# Patient Record
Sex: MALE | Race: WHITE | Employment: FULL TIME | URBAN - METROPOLITAN AREA
[De-identification: names, ages, dates, MRNs, and addresses within clinical notes are randomized per-mention and may not be internally consistent; named-entity substitution may affect disease eponyms.]

---

## 2017-03-09 DIAGNOSIS — R73.03 PREDIABETES: ICD-10-CM

## 2017-03-09 RX ORDER — METFORMIN HYDROCHLORIDE 500 MG/1
500 TABLET, EXTENDED RELEASE ORAL
Qty: 90 TAB | Refills: 0 | Status: SHIPPED | OUTPATIENT
Start: 2017-03-09 | End: 2017-07-05 | Stop reason: SDUPTHER

## 2017-03-27 ENCOUNTER — TELEPHONE (OUTPATIENT)
Dept: FAMILY MEDICINE CLINIC | Age: 47
End: 2017-03-27

## 2017-03-27 DIAGNOSIS — I10 ESSENTIAL HYPERTENSION: Primary | ICD-10-CM

## 2017-03-27 DIAGNOSIS — R73.03 PREDIABETES: ICD-10-CM

## 2017-03-27 NOTE — TELEPHONE ENCOUNTER
Patient would like to know if he needs to have fasting labs for his next appt as he would like to have them drawn prior to his visit. If so, he would like to have the lab slips placed up front for p/u.

## 2017-03-29 NOTE — TELEPHONE ENCOUNTER
Called and spoke with pt's wife, and she has been advised pt's lab slips are at the  for . Pt's wife request lab slips be mailed to the home, this has been done.

## 2017-04-19 RX ORDER — MONTELUKAST SODIUM 10 MG/1
TABLET ORAL
Qty: 90 TAB | Refills: 3 | Status: SHIPPED | OUTPATIENT
Start: 2017-04-19 | End: 2017-06-16 | Stop reason: SDUPTHER

## 2017-06-07 LAB
BUN SERPL-MCNC: 16 MG/DL (ref 6–24)
BUN/CREAT SERPL: 19 (ref 9–20)
CALCIUM SERPL-MCNC: 9.8 MG/DL (ref 8.7–10.2)
CHLORIDE SERPL-SCNC: 100 MMOL/L (ref 96–106)
CO2 SERPL-SCNC: 25 MMOL/L (ref 18–29)
CREAT SERPL-MCNC: 0.86 MG/DL (ref 0.76–1.27)
EST. AVERAGE GLUCOSE BLD GHB EST-MCNC: 137 MG/DL
GLUCOSE SERPL-MCNC: 120 MG/DL (ref 65–99)
HBA1C MFR BLD: 6.4 % (ref 4.8–5.6)
POTASSIUM SERPL-SCNC: 4.3 MMOL/L (ref 3.5–5.2)
SODIUM SERPL-SCNC: 141 MMOL/L (ref 134–144)

## 2017-06-09 ENCOUNTER — OFFICE VISIT (OUTPATIENT)
Dept: FAMILY MEDICINE CLINIC | Age: 47
End: 2017-06-09

## 2017-06-09 VITALS
WEIGHT: 256 LBS | RESPIRATION RATE: 20 BRPM | BODY MASS INDEX: 36.65 KG/M2 | SYSTOLIC BLOOD PRESSURE: 123 MMHG | TEMPERATURE: 97.9 F | HEIGHT: 70 IN | DIASTOLIC BLOOD PRESSURE: 76 MMHG | HEART RATE: 80 BPM

## 2017-06-09 DIAGNOSIS — E66.9 NON MORBID OBESITY, UNSPECIFIED OBESITY TYPE: ICD-10-CM

## 2017-06-09 DIAGNOSIS — R73.03 PREDIABETES: ICD-10-CM

## 2017-06-09 DIAGNOSIS — I10 ESSENTIAL HYPERTENSION: Primary | ICD-10-CM

## 2017-06-09 NOTE — PROGRESS NOTES
Chief Complaint   Patient presents with    Hypertension     follow up     Diabetes     prediabetes

## 2017-06-09 NOTE — PROGRESS NOTES
HISTORY OF PRESENT ILLNESS  Mario Shaikh is a 50 y.o. male. Hypertension   The history is provided by the patient. This is a chronic problem. The current episode started more than 1 week ago. The problem occurs constantly. The problem has not changed since onset. Pertinent negatives include no chest pain, no abdominal pain, no headaches and no shortness of breath. Nothing aggravates the symptoms. Relieved by: lifestyle modifications. Treatments tried: lifestyle modifications. The treatment provided significant relief. Other   The history is provided by the patient (prediabetes). Pertinent negatives include no chest pain, no abdominal pain, no headaches and no shortness of breath. Treatments tried: metformin. Review of Systems   Constitutional: Negative for weight loss. Weight gain   Eyes: Negative for blurred vision. Respiratory: Negative for shortness of breath. Cardiovascular: Negative for chest pain and leg swelling. Gastrointestinal: Negative for abdominal pain. Genitourinary: Negative for frequency. Neurological: Negative for dizziness, sensory change, speech change, focal weakness and headaches. Endo/Heme/Allergies: Negative for polydipsia. Lab Results   Component Value Date/Time    Hemoglobin A1c 6.4 06/06/2017 08:42 AM         Visit Vitals    /76    Pulse 80    Temp 97.9 °F (36.6 °C) (Oral)    Resp 20    Ht 5' 10\" (1.778 m)    Wt 256 lb (116.1 kg)    BMI 36.73 kg/m2     BP Readings from Last 3 Encounters:   06/09/17 123/76   09/29/16 120/83   03/18/16 118/76     Physical Exam   Constitutional: He is oriented to person, place, and time. He appears well-developed and well-nourished. No distress. Cardiovascular: Normal rate, regular rhythm and normal heart sounds. Exam reveals no gallop and no friction rub. No murmur heard. Pulmonary/Chest: Effort normal and breath sounds normal. No respiratory distress. He has no wheezes. He has no rales. Musculoskeletal: He exhibits no edema. Neurological: He is alert and oriented to person, place, and time. Skin: Skin is warm and dry. He is not diaphoretic. Nursing note and vitals reviewed. ASSESSMENT and PLAN    ICD-10-CM ICD-9-CM    1. Essential hypertension I10 401.9    2. Prediabetes R73.03 790.29    3. Non morbid obesity, unspecified obesity type E66.9 278.00         Blood pressure controlled  Prediabetic, almost diabetic by A1C  The need for weight loss and exercise emphasized for prevention of diabetes  Continue current plans. I have reviewed/discussed the above normal BMI with the patient. I have recommended the following interventions: dietary management education, guidance, and counseling . Yvonne Morales Follow-up Disposition:  Return in about 6 months (around 12/9/2017) for blood pressure, prediabetes. Reviewed plan of care. Patient has provided input and agrees with goals.

## 2017-06-09 NOTE — MR AVS SNAPSHOT
Visit Information Date & Time Provider Department Dept. Phone Encounter #  
 6/9/2017  2:00 PM Clint [facility]burn[facility] [id number] Follow-up Instructions Return in about 6 months (around 12/9/2017) for blood pressure, prediabetes. Upcoming Health Maintenance Date Due INFLUENZA AGE 9 TO ADULT 8/1/2017 DTaP/Tdap/Td series (2 - Td) 3/8/2026 Allergies as of 6/9/2017  Review Complete On: 6/9/2017 By: Clint Washburn MD  
 No Known Allergies Current Immunizations  Never Reviewed Name Date DTaP 3/8/2016 Influenza Vaccine (Quad) PF 11/14/2016, 11/6/2015, 9/23/2014 Not reviewed this visit You Were Diagnosed With   
  
 Codes Comments Essential hypertension    -  Primary ICD-10-CM: I10 
ICD-9-CM: 401.9 Prediabetes     ICD-10-CM: R73.03 
ICD-9-CM: 790.29 Non morbid obesity, unspecified obesity type     ICD-10-CM: E66.9 ICD-9-CM: 278.00 Vitals BP Pulse Temp Resp Height(growth percentile) Weight(growth percentile) 123/76 80 97.9 °F (36.6 °C) (Oral) 20 5' 10\" (1.778 m) 256 lb (116.1 kg) BMI Smoking Status 36.73 kg/m2 Never Smoker Vitals History BMI and BSA Data Body Mass Index Body Surface Area  
 36.73 kg/m 2 2.39 m 2 Preferred Pharmacy Pharmacy Name Phone 305 [facility], [address] [place] [address] [address] [facility] Your Updated Medication List  
  
   
This list is accurate as of: 6/9/17  2:57 PM.  Always use your most recent med list.  
  
  
  
  
 ALEVE 220 mg tablet Generic drug:  naproxen sodium Take 1 Tab by mouth two (2) times daily as needed. CO Q-10 10 mg Cap Generic drug:  coenzyme q10 Take  by mouth.  
  
 esomeprazole 20 mg capsule Commonly known as:  Ansari Sondra Take  by mouth daily. fenofibrate 160 mg tablet Commonly known as:  LOFIBRA Take 1 tablet by mouth  daily before breakfast  
  
 glucosamine-chondroitin 500-400 mg Cap Commonly known as:  20 [facility] Take 1 Cap by mouth daily. metFORMIN  mg tablet Commonly known as:  GLUCOPHAGE XR Take 1 Tab by mouth daily (with dinner). montelukast 10 mg tablet Commonly known as:  SINGULAIR Take 1 tablet by mouth  daily  
  
 multivitamin tablet Commonly known as:  ONE A DAY Take 1 Tab by mouth daily. sertraline 100 mg tablet Commonly known as:  ZOLOFT Take 1 tablet by mouth  daily  
  
 simvastatin 40 mg tablet Commonly known as:  ZOCOR Take 1 tablet by mouth  nightly Follow-up Instructions Return in about 6 months (around 12/9/2017) for blood pressure, prediabetes. Introducing [facility] & HE[facility]! Mervat Pritchett introduces Siriona patient portal. Now you can access parts of your medical record, email your doctor's office, and request medication refills online. 1. In your internet browser, go to [email or web address] 2. Click on the First Time User? Click Here link in the Sign In box. You will see the New Member Sign Up page. 3. Enter your Siriona Access Code exactly as it appears below. You will not need to use this code after youve completed the sign-up process. If you do not sign up before the expiration date, you must request a new code. · Siriona Access Code: [email or web address] Expires: 9/7/2017  1:51 PM 
 
4. Enter the last four digits of your Social Security Number (xxxx) and Date of Birth (mm/dd/yyyy) as indicated and click Submit. You will be taken to the next sign-up page. 5. Create a Siriona ID. This will be your Siriona login ID and cannot be changed, so think of one that is secure and easy to remember. 6. Create a Siriona password. You can change your password at any time. 7. Enter your Password Reset Question and Answer. This can be used at a later time if you forget your password. 8. Enter your e-mail address. You will receive e-mail notification when new information is available in [address] 9. Click Sign Up. You can now view and download portions of your medical record. 10. Click the Download Summary menu link to download a portable copy of your medical information. If you have questions, please visit the Frequently Asked Questions section of the Re[employer]on website. Remember, Replicon is NOT to be used for urgent needs. For medical emergencies, dial 911. Now available from your iPhone and Android! Please provide this summary of care documentation to your next provider. Your primary care clinician is listed as Dulce Gomez. If you have any questions after today's visit, please call [phone].

## 2017-06-17 RX ORDER — MONTELUKAST SODIUM 10 MG/1
TABLET ORAL
Qty: 90 TAB | Refills: 3 | Status: SHIPPED | COMMUNITY
Start: 2017-06-17 | End: 2017-07-05 | Stop reason: SDUPTHER

## 2017-07-05 DIAGNOSIS — R73.03 PREDIABETES: ICD-10-CM

## 2017-07-06 RX ORDER — METFORMIN HYDROCHLORIDE 500 MG/1
500 TABLET, EXTENDED RELEASE ORAL
Qty: 90 TAB | Refills: 1 | Status: SHIPPED | OUTPATIENT
Start: 2017-07-06 | End: 2017-11-10 | Stop reason: SDUPTHER

## 2017-07-06 RX ORDER — MONTELUKAST SODIUM 10 MG/1
TABLET ORAL
Qty: 90 TAB | Refills: 3 | Status: SHIPPED | OUTPATIENT
Start: 2017-07-06 | End: 2018-08-13 | Stop reason: SDUPTHER

## 2017-09-09 DIAGNOSIS — E78.00 HYPERCHOLESTEROLEMIA: Primary | ICD-10-CM

## 2017-09-10 RX ORDER — FENOFIBRATE 160 MG/1
TABLET ORAL
Qty: 90 TAB | Refills: 0 | Status: SHIPPED | OUTPATIENT
Start: 2017-09-10 | End: 2017-11-29 | Stop reason: SDUPTHER

## 2017-09-11 NOTE — TELEPHONE ENCOUNTER
Please call patient and let him know he is due to have his cholesterol checked. I have printed a lab slip.

## 2017-09-11 NOTE — TELEPHONE ENCOUNTER
Called and spoke with pt, and he has been advised and states understanding of this. Lab slips have been mailed to pt.

## 2017-09-13 RX ORDER — SIMVASTATIN 40 MG/1
TABLET, FILM COATED ORAL
Qty: 90 TAB | Refills: 0 | Status: SHIPPED | OUTPATIENT
Start: 2017-09-13 | End: 2017-12-02 | Stop reason: SDUPTHER

## 2017-11-10 DIAGNOSIS — R73.03 PREDIABETES: ICD-10-CM

## 2017-11-10 NOTE — TELEPHONE ENCOUNTER
Pt called requesting refill for his Metformin be sent to his mail order pharmacy. Pt almost out of prescription and was advised to contact pharmacy for express shipping if available to avoid running out of his medication.  Anthony

## 2017-11-13 RX ORDER — METFORMIN HYDROCHLORIDE 500 MG/1
500 TABLET, EXTENDED RELEASE ORAL
Qty: 90 TAB | Refills: 1 | Status: SHIPPED | OUTPATIENT
Start: 2017-11-13 | End: 2018-08-01 | Stop reason: SDUPTHER

## 2017-11-29 DIAGNOSIS — E78.00 HYPERCHOLESTEROLEMIA: ICD-10-CM

## 2017-12-03 RX ORDER — FENOFIBRATE 160 MG/1
TABLET ORAL
Qty: 90 TAB | Refills: 0 | Status: SHIPPED | OUTPATIENT
Start: 2017-12-03 | End: 2018-02-21 | Stop reason: SDUPTHER

## 2017-12-03 NOTE — TELEPHONE ENCOUNTER
Please call patient and remind them to have the labs I ordered in September done. Let them know I cannot continue to refill their prescription until they have these done.

## 2017-12-04 RX ORDER — SIMVASTATIN 40 MG/1
TABLET, FILM COATED ORAL
Qty: 90 TAB | Refills: 0 | Status: SHIPPED | OUTPATIENT
Start: 2017-12-04 | End: 2018-02-22 | Stop reason: SDUPTHER

## 2017-12-05 NOTE — TELEPHONE ENCOUNTER
Please call patient and remind them to have the cholesterol labs I ordered in September done. I cannot continue to refill their medications until these have been done.

## 2018-01-31 LAB
ALBUMIN SERPL-MCNC: 4.7 G/DL (ref 3.5–5.5)
ALP SERPL-CCNC: 50 IU/L (ref 39–117)
ALT SERPL-CCNC: 27 IU/L (ref 0–44)
AST SERPL-CCNC: 21 IU/L (ref 0–40)
BILIRUB DIRECT SERPL-MCNC: 0.1 MG/DL (ref 0–0.4)
BILIRUB SERPL-MCNC: 0.3 MG/DL (ref 0–1.2)
CHOLEST SERPL-MCNC: 154 MG/DL (ref 100–199)
HDL SERPL-SCNC: 41.7 UMOL/L
HDLC SERPL-MCNC: 43 MG/DL
INTERPRETATION, 910389: NORMAL
LDL SERPL QN: 20.4 NM
LDL SERPL-SCNC: 944 NMOL/L
LDL SMALL SERPL-SCNC: 504 NMOL/L
LDLC SERPL CALC-MCNC: 82 MG/DL (ref 0–99)
LP-IR SCORE SERPL: 76
PROT SERPL-MCNC: 7.2 G/DL (ref 6–8.5)
TRIGL SERPL-MCNC: 146 MG/DL (ref 0–149)

## 2018-02-02 ENCOUNTER — OFFICE VISIT (OUTPATIENT)
Dept: FAMILY MEDICINE CLINIC | Age: 48
End: 2018-02-02

## 2018-02-02 VITALS
BODY MASS INDEX: 36.08 KG/M2 | HEIGHT: 70 IN | HEART RATE: 74 BPM | DIASTOLIC BLOOD PRESSURE: 85 MMHG | WEIGHT: 252 LBS | TEMPERATURE: 98.3 F | SYSTOLIC BLOOD PRESSURE: 137 MMHG | RESPIRATION RATE: 18 BRPM

## 2018-02-02 DIAGNOSIS — R73.03 PREDIABETES: ICD-10-CM

## 2018-02-02 DIAGNOSIS — Z23 ENCOUNTER FOR IMMUNIZATION: ICD-10-CM

## 2018-02-02 DIAGNOSIS — L85.3 XEROSIS OF SKIN: ICD-10-CM

## 2018-02-02 DIAGNOSIS — I10 ESSENTIAL HYPERTENSION: Primary | ICD-10-CM

## 2018-02-02 DIAGNOSIS — L82.1 SK (SEBORRHEIC KERATOSIS): ICD-10-CM

## 2018-02-02 RX ORDER — HYDROCORTISONE 25 MG/G
CREAM TOPICAL 2 TIMES DAILY
Qty: 30 G | Refills: 0 | Status: SHIPPED | OUTPATIENT
Start: 2018-02-02 | End: 2018-11-15

## 2018-02-02 NOTE — PROGRESS NOTES
Chief Complaint   Patient presents with    Blood Pressure Check     and prediabetes f/u     1. Have you been to the ER, urgent care clinic since your last visit? Yes [facility]2017 dx sinus infection  Hospitalized since your last visit? No    2. Have you seen or consulted any other health care providers outside of the [address] [place] [address] since your last visit? Include any pap smears or colon screening.  No

## 2018-02-02 NOTE — PATIENT INSTRUCTIONS
Seborrheic Keratosis: Care Instructions  Your Care Instructions  Seborrheic keratoses are raised skin growths that look scaly or warty. They usually look like they were stuck onto the skin. They most often grow in groups on the back or chest and are more common in older people. A seborrheic keratosis can be tan or dark brown. A seborrheic keratosis is not a mole and is almost always harmless. But it is still a good idea to check your skin regularly. Sometimes a seborrheic keratosis can itch. Scratching it can cause it to bleed and sometimes even scar. A seborrheic keratosis is removed only if it bothers you. The doctor will freeze it or scrape it off with a tool. The doctor can also use a laser to remove a seborrheic keratosis. Treatment usually results in normal-looking skin, but it can leave a light or dark artemio or even a scar on the skin. Follow-up care is a key part of your treatment and safety. Be sure to make and go to all appointments, and call your doctor if you are having problems. It's also a good idea to know your test results and keep a list of the medicines you take. How can you care for yourself at home? · If clothing irritates your seborrheic keratosis, cover it with a bandage to prevent rubbing and bleeding. · If you have a seborrheic keratosis removed, clean the area with soap and water two times a day unless your doctor gives you different instructions. Don't use hydrogen peroxide or alcohol, which can slow healing. ¨ You may cover the wound with a thin layer of petroleum jelly, such as Vaseline, and a nonstick bandage. · Check all the skin on your body once a month for skin growths or other changes, such as color and feel of the skin. ¨  front of a full-length mirror. Look carefully at the front and back of your body. Then look at your right and left sides with your arms raised.   SIR[facility] your elbows and look carefully at your forearms, the back of your upper arms, and your palms.  ¨ Look at your feet, the soles of your feet, and the spaces between your toes. ¨ Use a hand mirror to look at the back of your legs, the back of your neck, and your back, rear end (buttocks), and genital area. Part the hair on your head to look at your scalp. · If you see a change in a skin growth, contact your doctor. Look for:  ¨ A mole that bleeds. ¨ A fast-growing mole. ¨ A scaly or crusted growth on the skin. ¨ A sore that will not heal.  When should you call for help? Call your doctor now or seek immediate medical care if:  ? · You have an area of normal skin that suddenly changes in shape, size, or how it looks. ? · Your skin is badly broken from scratching. ? · You have signs of infection such as:  ¨ Pain, warmth, or swelling in your skin. ¨ Red streaks near a wound in your skin. ¨ Pus coming from a wound in your skin. ¨ A fever not due to the flu or other illness. ? Watch closely for changes in your health, and be sure to contact your doctor if:  ? · You do not get better as expected. Where can you learn more? Go to http://alba-sarmad.info/. Enter T193 in the search box to learn more about \"Seborrheic Keratosis: Care Instructions. \"  Current as of: October 13, 2016  Content Version: 11.4  © 6146-3944 [employer]. Care instructions adapted under license by [employer] (which disclaims liability or warranty for this information). If you have questions about a medical condition or this instruction, always ask your healthcare professional. [address] any warranty or liability for your use of this information.

## 2018-02-02 NOTE — MR AVS SNAPSHOT
[address] 
[phone] Patient: Love Abbott MRN: [id number] :1970 Visit Information Date & Time Provider Department Dept. Phone Encounter #  
 2018  2:45 PM Dev [facility] 34 [id number] Follow-up Instructions Return in about 6 months (around 2018) for blood pressure, prediabetes. Upcoming Health Maintenance Date Due DTaP/Tdap/Td series (2 - Tdap) 3/8/2026 Allergies as of 2018  Review Complete On: 2018 By: James Jo MD  
 No Known Allergies Current Immunizations  Reviewed on 2018 Name Date DTaP 3/8/2016 Influenza Vaccine (Quad) PF 2018, 2016, 2015, 2014 Reviewed by Mckay Perez LPN on 2757 at  1:21 PM  
You Were Diagnosed With   
  
 Codes Comments Encounter for immunization    -  Primary ICD-10-CM: G74 ICD-9-CM: V03.89 Prediabetes     ICD-10-CM: R73.03 
ICD-9-CM: 790.29 Essential hypertension     ICD-10-CM: I10 
ICD-9-CM: 401.9 SK (seborrheic keratosis)     ICD-10-CM: L82.1 ICD-9-CM: 702.19 Xerosis of skin     ICD-10-CM: L85.3 ICD-9-CM: 706.8 Vitals BP Pulse Temp Resp Height(growth percentile) Weight(growth percentile) 137/85 74 98.3 °F (36.8 °C) (Oral) 18 5' 10\" (1.778 m) 252 lb (114.3 kg) BMI Smoking Status 36.16 kg/m2 Never Smoker Vitals History BMI and BSA Data Body Mass Index Body Surface Area  
 36.16 kg/m 2 2.38 m 2 Preferred Pharmacy Pharmacy Name Phone 305 [facility], [address] [address] [place] [address] [address] Your Updated Medication List  
  
   
This list is accurate as of: 18  4:12 PM.  Always use your most recent med list.  
  
  
  
  
 ALEVE 220 mg tablet Generic drug:  naproxen sodium Take 1 Tab by mouth two (2) times daily as needed. CO Q-10 10 mg Cap Generic drug:  coenzyme q10 Take  by mouth.  
  
 esomeprazole 20 mg capsule Commonly known as:  Jose Antonio Jetty Take  by mouth daily. fenofibrate 160 mg tablet Commonly known as:  LOFIBRA TAKE 1 TABLET BY MOUTH  DAILY BEFORE BREAKFAST  
  
 glucosamine-chondroitin 500-400 mg Cap Commonly known as:  20 [facility] Take 1 Cap by mouth daily. hydrocortisone 2.5 % topical cream  
Commonly known as:  HYTONE Apply  to affected area two (2) times a day. metFORMIN  mg tablet Commonly known as:  GLUCOPHAGE XR Take 1 Tab by mouth daily (with dinner). montelukast 10 mg tablet Commonly known as:  SINGULAIR Take 1 tablet by mouth  daily  
  
 multivitamin tablet Commonly known as:  ONE A DAY Take 1 Tab by mouth daily. sertraline 100 mg tablet Commonly known as:  ZOLOFT Take 1 tablet by mouth  daily  
  
 simvastatin 40 mg tablet Commonly known as:  ZOCOR  
TAKE 1 TABLET BY MOUTH  NIGHTLY Prescriptions Sent to Pharmacy Refills  
 hydrocortisone (HYTONE) 2.5 % topical cream 0 Sig: Apply  to affected area two (2) times a day. Class: Normal  
 Pharmacy: [facility] Ph #: [phone] Route: Topical  
  
We Performed the Following HEMOGLOBIN A1C WITH EAG [88861 CPT(R)] INFLUENZA VIRUS VAC QUAD,SPLIT,PRESV FREE SYRINGE IM [id number] CPT(R)] METABOLIC PANEL, BASIC [20843 CPT(R)] Follow-up Instructions Return in about 6 months (around 8/2/2018) for blood pressure, prediabetes. Patient Instructions Seborrheic Keratosis: Care Instructions Your Care Instructions Seborrheic keratoses are raised skin growths that look scaly or warty. They usually look like they were stuck onto the skin. They most often grow in groups on the back or chest and are more common in older people. A seborrheic keratosis can be tan or dark brown.  A seborrheic keratosis is not a mole and is almost always harmless. But it is still a good idea to check your skin regularly. Sometimes a seborrheic keratosis can itch. Scratching it can cause it to bleed and sometimes even scar. A seborrheic keratosis is removed only if it bothers you. The doctor will freeze it or scrape it off with a tool. The doctor can also use a laser to remove a seborrheic keratosis. Treatment usually results in normal-looking skin, but it can leave a light or dark artemio or even a scar on the skin. Follow-up care is a key part of your treatment and safety. Be sure to make and go to all appointments, and call your doctor if you are having problems. It's also a good idea to know your test results and keep a list of the medicines you take. How can you care for yourself at home? · If clothing irritates your seborrheic keratosis, cover it with a bandage to prevent rubbing and bleeding. · If you have a seborrheic keratosis removed, clean the area with soap and water two times a day unless your doctor gives you different instructions. Don't use hydrogen peroxide or alcohol, which can slow healing. ¨ You may cover the wound with a thin layer of petroleum jelly, such as Vaseline, and a nonstick bandage. · Check all the skin on your body once a month for skin growths or other changes, such as color and feel of the skin. ¨  front of a full-length mirror. Look carefully at the front and back of your body. Then look at your right and left sides with your arms raised. [facility]IR[facility] your elbows and look carefully at your forearms, the back of your upper arms, and your palms. ¨ Look at your feet, the soles of your feet, and the spaces between your toes. ¨ Use a hand mirror to look at the back of your legs, the back of your neck, and your back, rear end (buttocks), and genital area. Part the hair on your head to look at your scalp. · If you see a change in a skin growth, contact your doctor. Look for: ¨ A mole that bleeds. ¨ A fast-growing mole. ¨ A scaly or crusted growth on the skin. ¨ A sore that will not heal. 
When should you call for help? Call your doctor now or seek immediate medical care if: 
? · You have an area of normal skin that suddenly changes in shape, size, or how it looks. ? · Your skin is badly broken from scratching. ? · You have signs of infection such as: 
¨ Pain, warmth, or swelling in your skin. ¨ Red streaks near a wound in your skin. ¨ Pus coming from a wound in your skin. ¨ A fever not due to the flu or other illness. ? Watch closely for changes in your health, and be sure to contact your doctor if: 
? · You do not get better as expected. Where can you learn more? Go to http://alba-sarmad.info/. Enter O990 in the search box to learn more about \"Seborrheic Keratosis: Care Instructions. \" Current as of: October 13, 2016 Content Version: 11.4 © 0442-9648 [employer]. Care instructions adapted under license by [employer] (which disclaims liability or warranty for this information). If you have questions about a medical condition or this instruction, always ask your healthcare professional. [address] any warranty or liability for your use of this information. Introducing [facility] & HEALTH SERVICES! Pool Rivas introduces 1Ring patient portal. Now you can access parts of your medical record, email your doctor's office, and request medication refills online. 1. In your internet browser, go to [email or web address] 2. Click on the First Time User? Click Here link in the Sign In box. You will see the New Member Sign Up page. 3. Enter your 1Ring Access Code exactly as it appears below. You will not need to use this code after youve completed the sign-up process. If you do not sign up before the expiration date, you must request a new code. · 1Ring Access Code: [email or web address] Expires: 5/3/2018  4:11 PM 
 
4. Enter the last four digits of your Social Security Number (xxxx) and Date of Birth (mm/dd/yyyy) as indicated and click Submit. You will be taken to the next sign-up page. 5. Create a GetGoing ID. This will be your GetGoing login ID and cannot be changed, so think of one that is secure and easy to remember. 6. Create a GetGoing password. You can change your password at any time. 7. Enter your Password Reset Question and Answer. This can be used at a later time if you forget your password. 8. Enter your e-mail address. You will receive e-mail notification when new information is available in [address] 9. Click Sign Up. You can now view and download portions of your medical record. 10. Click the Download Summary menu link to download a portable copy of your medical information. If you have questions, please visit the Frequently Asked Questions section of the GetGoing website. Remember, GetGoing is NOT to be used for urgent needs. For medical emergencies, dial 911. Now available from your iPhone and Android! Please provide this summary of care documentation to your next provider. Your primary care clinician is listed as Author Daubs. If you have any questions after today's visit, please call [phone].

## 2018-02-02 NOTE — PROGRESS NOTES
HISTORY OF PRESENT ILLNESS  Medina Martinez is a 50 y.o. male. Blood Pressure Check   The history is provided by the patient (and prediabetes). This is a chronic problem. The current episode started more than 1 week ago. The problem occurs constantly. The problem has been gradually worsening. Associated symptoms include headaches. Pertinent negatives include no chest pain, no abdominal pain and no shortness of breath. Associated symptoms comments: He has a sinus headache today. Nothing aggravates the symptoms. Relieved by: lifestyle modifications. Treatments tried: lifestyle modifications. The treatment provided significant relief. Review of Systems   Constitutional: Negative for weight loss. No weight gain   Eyes: Negative for blurred vision. Respiratory: Negative for shortness of breath. Cardiovascular: Negative for chest pain and leg swelling. Gastrointestinal: Negative for abdominal pain. Genitourinary: Negative for frequency. Neurological: Positive for headaches. Negative for dizziness, sensory change, speech change and focal weakness. Endo/Heme/Allergies: Negative for polydipsia. Lab Results   Component Value Date/Time    Hemoglobin A1c 6.4 06/06/2017 08:42 AM           Visit Vitals    /85    Pulse 74    Temp 98.3 °F (36.8 °C) (Oral)    Resp 18    Ht 5' 10\" (1.778 m)    Wt 252 lb (114.3 kg)    BMI 36.16 kg/m2     BP Readings from Last 3 Encounters:   02/02/18 137/85   06/09/17 123/76   09/29/16 120/83     Physical Exam   Constitutional: He is oriented to person, place, and time. He appears well-developed and well-nourished. No distress. HENT:   Head:       Cardiovascular: Normal rate, regular rhythm and normal heart sounds. Exam reveals no gallop and no friction rub. No murmur heard. Pulmonary/Chest: Effort normal and breath sounds normal. No respiratory distress. He has no wheezes. He has no rales. Musculoskeletal: He exhibits no edema.    Neurological: He is alert and oriented to person, place, and time. Skin: Skin is warm and dry. He is not diaphoretic.   3 SKs on the left nipple   Nursing note and vitals reviewed. ASSESSMENT and PLAN    ICD-10-CM ICD-9-CM    1. Essential hypertension O71 662.7 METABOLIC PANEL, BASIC   2. Prediabetes R73.03 790.29 HEMOGLOBIN A1C WITH EAG      METABOLIC PANEL, BASIC   3. SK (seborrheic keratosis) L82.1 702.19    4. Xerosis of skin L85.3 706.8 hydrocortisone (HYTONE) 2.5 % topical cream   5. Encounter for immunization Z23 V03.89 INFLUENZA VIRUS VAC QUAD,SPLIT,PRESV FREE SYRINGE IM        Blood pressure controlled  Needs A1C  Benign SKs  Dry skin  Continue current plans. Labs per orders. Reassured that the SKs are benign  Hydrocortisone cream prn  Flu shot today    Follow-up Disposition:  Return in about 6 months (around 8/2/2018) for blood pressure, prediabetes. Reviewed plan of care. Patient has provided input and agrees with goals.

## 2018-02-21 DIAGNOSIS — E78.00 HYPERCHOLESTEROLEMIA: ICD-10-CM

## 2018-02-22 RX ORDER — FENOFIBRATE 160 MG/1
TABLET ORAL
Qty: 90 TAB | Refills: 3 | Status: SHIPPED | OUTPATIENT
Start: 2018-02-22 | End: 2019-01-21 | Stop reason: SDUPTHER

## 2018-02-25 RX ORDER — SIMVASTATIN 40 MG/1
TABLET, FILM COATED ORAL
Qty: 90 TAB | Refills: 3 | Status: SHIPPED | OUTPATIENT
Start: 2018-02-25 | End: 2019-01-24 | Stop reason: SDUPTHER

## 2018-03-04 RX ORDER — SERTRALINE HYDROCHLORIDE 100 MG/1
TABLET, FILM COATED ORAL
Qty: 90 TAB | Refills: 3 | Status: SHIPPED | OUTPATIENT
Start: 2018-03-04 | End: 2019-01-29 | Stop reason: SDUPTHER

## 2018-08-01 DIAGNOSIS — R73.03 PREDIABETES: ICD-10-CM

## 2018-08-01 NOTE — TELEPHONE ENCOUNTER
----- Message from Carina Westbrook sent at 8/1/2018  2:47 PM EDT -----  Regarding: Dr. Anjana Loomis [phone]   Pt stated, ran out \"Metformin\" last evening. Pt is requesting refill be called into [address] [place] [address] [phone]. Also requesting Rx be called into [facility] (on file).

## 2018-08-04 RX ORDER — METFORMIN HYDROCHLORIDE 500 MG/1
500 TABLET, EXTENDED RELEASE ORAL
Qty: 90 TAB | Refills: 1 | Status: SHIPPED | OUTPATIENT
Start: 2018-08-04 | End: 2018-11-15 | Stop reason: SDUPTHER

## 2018-08-14 RX ORDER — MONTELUKAST SODIUM 10 MG/1
TABLET ORAL
Qty: 90 TAB | Refills: 1 | Status: SHIPPED | OUTPATIENT
Start: 2018-08-14 | End: 2019-01-24 | Stop reason: SDUPTHER

## 2018-09-19 LAB
BUN SERPL-MCNC: 17 MG/DL (ref 6–24)
BUN/CREAT SERPL: 18 (ref 9–20)
CALCIUM SERPL-MCNC: 9.6 MG/DL (ref 8.7–10.2)
CHLORIDE SERPL-SCNC: 103 MMOL/L (ref 96–106)
CO2 SERPL-SCNC: 24 MMOL/L (ref 20–29)
CREAT SERPL-MCNC: 0.95 MG/DL (ref 0.76–1.27)
EST. AVERAGE GLUCOSE BLD GHB EST-MCNC: 140 MG/DL
GLUCOSE SERPL-MCNC: 136 MG/DL (ref 65–99)
HBA1C MFR BLD: 6.5 % (ref 4.8–5.6)
POTASSIUM SERPL-SCNC: 4.2 MMOL/L (ref 3.5–5.2)
SODIUM SERPL-SCNC: 142 MMOL/L (ref 134–144)

## 2018-09-21 ENCOUNTER — OFFICE VISIT (OUTPATIENT)
Dept: FAMILY MEDICINE CLINIC | Age: 48
End: 2018-09-21

## 2018-09-21 VITALS
HEIGHT: 70 IN | HEART RATE: 75 BPM | WEIGHT: 254 LBS | TEMPERATURE: 97 F | DIASTOLIC BLOOD PRESSURE: 77 MMHG | RESPIRATION RATE: 20 BRPM | SYSTOLIC BLOOD PRESSURE: 128 MMHG | BODY MASS INDEX: 36.36 KG/M2

## 2018-09-21 DIAGNOSIS — E66.9 OBESITY (BMI 30-39.9): ICD-10-CM

## 2018-09-21 DIAGNOSIS — R73.03 PREDIABETES: ICD-10-CM

## 2018-09-21 DIAGNOSIS — E78.5 HYPERLIPIDEMIA, UNSPECIFIED HYPERLIPIDEMIA TYPE: ICD-10-CM

## 2018-09-21 DIAGNOSIS — E78.00 HYPERCHOLESTEROLEMIA: ICD-10-CM

## 2018-09-21 DIAGNOSIS — I10 ESSENTIAL HYPERTENSION: ICD-10-CM

## 2018-09-21 DIAGNOSIS — Z23 ENCOUNTER FOR IMMUNIZATION: ICD-10-CM

## 2018-09-21 DIAGNOSIS — Z12.11 COLON CANCER SCREENING: ICD-10-CM

## 2018-09-21 DIAGNOSIS — R73.01 IFG (IMPAIRED FASTING GLUCOSE): Primary | ICD-10-CM

## 2018-09-21 PROBLEM — E66.01 SEVERE OBESITY (BMI 35.0-39.9): Status: ACTIVE | Noted: 2018-09-21

## 2018-09-21 NOTE — ASSESSMENT & PLAN NOTE
The patient had an A1c of 6.5 which technically falls into the diabetic range, we will recheck this in 3-4 months and if persistently elevated we will begin further treatments for diabetes, for now he will not change any of his medication management however I have strongly encouraged him to follow a diabetic friendly diet and to exercise as this can improve insulin sensitivity in his muscles

## 2018-09-21 NOTE — ASSESSMENT & PLAN NOTE
As noted I encouraged the patient on a diabetic friendly diet, cutting about 300 jess a day can help to promote 1-2 pounds of weight loss a week, and exercising more regularly is advisable as well, this should be a mix of both cardiovascular and weightbearing exercises

## 2018-09-21 NOTE — ASSESSMENT & PLAN NOTE
Due for fasting lipid panel, I have encouraged the patient to have this done prior to his next visit, I have also encouraged him on weight loss and exercise

## 2018-09-21 NOTE — PROGRESS NOTES
Family Medicine Follow-Up Progress Note Patient: Dhaval Johnson 1970, 46 y.o., male Encounter Date: 9/21/2018 ASSESSMENT & PLAN Essential hypertension Currently the patient's blood pressure is well controlled, he will continue on his current regimen without any changes, I did discuss return and alarm precautions Hypercholesterolemia Due for fasting lipid panel, I have encouraged the patient to have this done prior to his next visit, I have also encouraged him on weight loss and exercise Prediabetes The patient had an A1c of 6.5 which technically falls into the diabetic range, we will recheck this in 3-4 months and if persistently elevated we will begin further treatments for diabetes, for now he will not change any of his medication management however I have strongly encouraged him to follow a diabetic friendly diet and to exercise as this can improve insulin sensitivity in his muscles Obesity (BMI 30-39. 9) As noted I encouraged the patient on a diabetic friendly diet, cutting about 300 jess a day can help to promote 1-2 pounds of weight loss a week, and exercising more regularly is advisable as well, this should be a mix of both cardiovascular and weightbearing exercises Orders Placed This Encounter  Influenza virus vaccine (QUADRIVALENT PRES FREE SYRINGE) IM ([id number])  LIPID PANEL Standing Status:   Future Standing Expiration Date:   9/21/2019  METABOLIC PANEL, COMPREHENSIVE Standing Status:   Future Standing Expiration Date:   9/21/2019  
 HEMOGLOBIN A1C WITH EAG Standing Status:   Future Standing Expiration Date:   9/21/2019  Issac Gonzalez [facility] Referral Priority:   Routine Referral Type:   Consultation Referral Reason:   Specialty Services Required Referral Location:   [facility]  
  Referred to Provider:   Kati Correa MD  
  COLONOSCOPY  varicella-zoster recombinant, PF, (SHINGRIX, PF,) 50 mcg/0.5 mL susr injection Si.5mL by IntraMUSCular route once now and then repeat in 2-6 months Dispense:  0.5 mL Refill:  1 ICD-10-CM ICD-9-CM 1. IFG (impaired fasting glucose) R73.01 790.21 HEMOGLOBIN A1C WITH EAG 2. Hyperlipidemia, unspecified hyperlipidemia type E78.5 272.4 LIPID PANEL 3. Essential hypertension D47 249.7 METABOLIC PANEL, COMPREHENSIVE 4. Colon cancer screening Z12.11 V76.51  COLONOSCOPY REFERRAL TO GASTROENTEROLOGY 5. Encounter for immunization Z23 V03.89 varicella-zoster recombinant, PF, (SHINGRIX, PF,) 50 mcg/0.5 mL susr injection INFLUENZA VIRUS VAC QUAD,SPLIT,PRESV FREE SYRINGE IM 6. Hypercholesterolemia E78.00 272.0 7. Prediabetes R73.03 790.29   
8. Obesity (BMI 30-39. 9) E66.9 278.00 CHIEF COMPLAINT Chief Complaint Patient presents with  Hypertension  
  follow up  Immunization/Injection  
  flu DEBBY Greco is a 46 y.o. male presenting today for routine follow-up Hypertension: The patient reports he is compliant with his medications, he reports good blood pressure control to his knowledge, he denies any symptomatic hypertension, headaches, vision changes, shortness of breath, falling down or passing out. He denies any leg swelling. Impaired fasting glucose: Recently had his A1c done, his A1c is slowly trending up, the patient reports that he has not been doing a good job of managing his diet, he travels a lot for work which makes this difficult, he is going to do his best to do better and recently did refill his Metformin, he reports compliance with this medication without any side effects Patient would like the flu shot today Patient is wondering about having a colonoscopy, he recently turned 46 and his wife has been encouraging him to get this set up, he does not have a gastroenterologist in mind and he does not have any symptoms at this time of change in stool caliber, rectal bleeding, diarrhea, constipation. He would like to simply have his colonoscopy for screening purposes Obesity: As noted above the patient has had a hard time losing weight recently but he has not been actively trying and he does have a lot of dietary excursions, in the past he did use my fitness pal to help him track his food intake and found not to be quite helpful. Review of Systems A 12 point review of systems was negative except as noted here or in the HPI. OBJECTIVE Visit Vitals  /77  Pulse 75  Temp 97 °F (36.1 °C) (Oral)  Resp 20  
 Ht 5' 10\" (1.778 m)  Wt 254 lb (115.2 kg)  BMI 36.45 kg/m2 Physical Exam  
Constitutional: He is oriented to person, place, and time. He appears well-developed and well-nourished. No distress. NAD, Nontoxic, Appears Stated Age, obese HENT:  
Head: Normocephalic and atraumatic. Mouth/Throat: Oropharynx is clear and moist.  
Eyes: Conjunctivae and EOM are normal. Right eye exhibits no discharge. Left eye exhibits no discharge. No scleral icterus. Neck: Neck supple. Cardiovascular: Normal rate, regular rhythm and normal heart sounds. No murmur heard. No carotid bruits bilaterally Pulmonary/Chest: Effort normal and breath sounds normal. No stridor. No respiratory distress. He has no wheezes. He has no rales. Abdominal: Soft. Bowel sounds are normal. He exhibits no distension. There is no tenderness. Musculoskeletal: He exhibits no edema or tenderness. Neurological: He is alert and oriented to person, place, and time. Grossly intact CN Skin: Skin is warm and dry. No rash noted. He is not diaphoretic. Psychiatric: He has a normal mood and affect. His behavior is normal.  
Nursing note and vitals reviewed. No results found for any visits on 09/21/18. HISTORICAL Reviewed and updated today, and as noted below: 
 
Past Medical History:  
Diagnosis Date  Allergic rhinitis  Anxiety  Essential hypertension 8/1/2014  Family history of premature CAD 8/1/2014  
 HTN (hypertension) 8/1/2014  Hyperlipidemia  Hypertension  Obesity 9/23/2014  ROB (obstructive sleep apnea) 3/18/2016  Prediabetes 12/18/2015 Past Surgical History:  
Procedure Laterality Date  HX HEENT    
 wisdom teeth removal  
 
Family History Problem Relation Age of Onset  Cancer Mother   
  lung  Diabetes Father  Heart Disease Father 62 CAD  Hypertension Father History Smoking Status  Never Smoker Smokeless Tobacco  
 Never Used Social History Social History  Marital status:  Spouse name: N/A  
 Number of children: N/A  
 Years of education: N/A Social History Main Topics  Smoking status: Never Smoker  Smokeless tobacco: Never Used  Alcohol use No  
 Drug use: No  
 Sexual activity: Yes  
  Partners: Female Other Topics Concern  None Social History Narrative No Known Allergies No visits with results within 3 Month(s) from this visit. Latest known visit with results is: 
 
Office Visit on 02/02/2018 Component Date Value Ref Range Status  Hemoglobin A1c 09/18/2018 6.5* 4.8 - 5.6 % Final  
 Comment:          Prediabetes: 5.7 - 6.4 Diabetes: >6.4 Glycemic control for adults with diabetes: <7.0  Estimated average glucose 09/18/2018 140  mg/dL Final  
 Glucose 09/18/2018 136* 65 - 99 mg/dL Final  
 BUN 09/18/2018 17  6 - 24 mg/dL Final  
 Creatinine 09/18/2018 0.95  0.76 - 1.27 mg/dL Final  
 GFR est non-AA 09/18/2018 93  >59 mL/min/1.73 Final  
 GFR est AA 09/18/2018 107  >59 mL/min/1.73 Final  
 BUN/Creatinine ratio 09/18/2018 18  9 - 20 Final  
 Sodium 09/18/2018 142  134 - 144 mmol/L Final  
 Potassium 09/18/2018 4.2  3.5 - 5.2 mmol/L Final  
 Chloride 09/18/2018 103  96 - 106 mmol/L Final  
 CO2 09/18/2018 24  20 - 29 mmol/L Final  
  Calcium 09/18/2018 9.6  8.7 - 10.2 mg/dL Final  
 
 
 
Evelyn Mancia MD 
[address] 09/21/18 5:29 PM 
 
Portions of this note may have been populated using smart dictation software and may have \"sounds-like\" errors present. Pt was counseled on risks, benefits and alternatives of treatment options. All questions were asked and answered and the patient was agreeable with the treatment plan as outlined.

## 2018-09-21 NOTE — ASSESSMENT & PLAN NOTE
Currently the patient's blood pressure is well controlled, he will continue on his current regimen without any changes, I did discuss return and alarm precautions

## 2018-09-21 NOTE — MR AVS SNAPSHOT
[address] 
[phone] Patient: Jonnie Montez MRN: [id number] :1970 Visit Information Date & Time Provider Department Dept. Phone Encounter #  
 2018  3:15 PM Bob Wallace Willy [address] [id number] Follow-up Instructions Return in about 4 months (around 2019). Upcoming Health Maintenance Date Due FOBT Q 1 YEAR AGE 50-75 2018 Influenza Age 5 to Adult 2018 DTaP/Tdap/Td series (2 - Tdap) 3/8/2026 Allergies as of 2018  Review Complete On: 2018 By: Bob Wallace MD  
 No Known Allergies Current Immunizations  Reviewed on 2018 Name Date DTaP 3/8/2016 Influenza Vaccine (Quad) PF 2018, 2016, 2015, 2014 Not reviewed this visit You Were Diagnosed With   
  
 Codes Comments IFG (impaired fasting glucose)    -  Primary ICD-10-CM: R73.01 
ICD-9-CM: 790.21 Hyperlipidemia, unspecified hyperlipidemia type     ICD-10-CM: E78.5 ICD-9-CM: 272.4 Essential hypertension     ICD-10-CM: I10 
ICD-9-CM: 401.9 Colon cancer screening     ICD-10-CM: Z12.11 ICD-9-CM: V76.51 Encounter for immunization     ICD-10-CM: V96 ICD-9-CM: V03.89 Vitals BP Pulse Temp Resp Height(growth percentile) Weight(growth percentile) 128/77 75 97 °F (36.1 °C) (Oral) 20 5' 10\" (1.778 m) 254 lb (115.2 kg) BMI Smoking Status 36.45 kg/m2 Never Smoker Vitals History BMI and BSA Data Body Mass Index Body Surface Area  
 36.45 kg/m 2 2.39 m 2 Preferred Pharmacy Pharmacy Name Phone [address] [facility], [address] [facility] [address]uma [address] Your Updated Medication List  
  
   
This list is accurate as of 18  4:42 PM.  Always use your most recent med list.  
  
  
  
  
 ALEVE 220 mg tablet Generic drug:  naproxen sodium Take 1 Tab by mouth two (2) times daily as needed. CO Q-10 10 mg Cap Generic drug:  coenzyme q10 Take  by mouth.  
  
 esomeprazole 20 mg capsule Commonly known as:  Lafrances Toni Take  by mouth daily. fenofibrate 160 mg tablet Commonly known as:  LOFIBRA TAKE 1 TABLET BY MOUTH  DAILY BEFORE BREAKFAST  
  
 glucosamine-chondroitin 500-400 mg Cap Commonly known as:  20 [facility] Take 1 Cap by mouth daily. hydrocortisone 2.5 % topical cream  
Commonly known as:  HYTONE Apply  to affected area two (2) times a day. metFORMIN  mg tablet Commonly known as:  GLUCOPHAGE XR Take 1 Tab by mouth daily (with dinner). montelukast 10 mg tablet Commonly known as:  SINGULAIR  
TAKE 1 TABLET BY MOUTH  DAILY  
  
 multivitamin tablet Commonly known as:  ONE A DAY Take 1 Tab by mouth daily. sertraline 100 mg tablet Commonly known as:  ZOLOFT  
TAKE 1 TABLET BY MOUTH  DAILY  
  
 simvastatin 40 mg tablet Commonly known as:  ZOCOR  
TAKE 1 TABLET BY MOUTH  NIGHTLY  
  
 varicella-zoster recombinant (PF) 50 mcg/0.5 mL Susr injection Commonly known as:  SHINGRIX (PF)  
0.5mL by IntraMUSCular route once now and then repeat in 2-6 months Prescriptions Printed Refills  
 varicella-zoster recombinant, PF, (SHINGRIX, PF,) 50 mcg/0.5 mL susr injection 1 Si.5mL by IntraMUSCular route once now and then repeat in 2-6 months Class: Print We Performed the Following HM COLONOSCOPY [HM4 Custom] REFERRAL TO GASTROENTEROLOGY [GKV88 Custom] Follow-up Instructions Return in about 4 months (around 2019). To-Do List   
 2018 Lab:  HEMOGLOBIN A1C WITH EAG   
  
 2018 Lab:  LIPID PANEL   
  
 2018 Lab:  METABOLIC PANEL, COMPREHENSIVE Referral Information  Referral ID Referred By Referred To  
  
 [id number] [place], [address] [facility]   
 [address] [place] [address]  Bud Thomasonwood Agustin  Phone: [phone] Fax: [phone] Visits Status Start Date End Date 1 New Request 9/21/18 9/21/19 If your referral has a status of pending review or denied, additional information will be sent to support the outcome of this decision. Introducing [facility]! [employer] introduces Pl[employer]emeter patient portal. Now you can access parts of your medical record, email your doctor's office, and request medication refills online. 1. In your internet browser, go to [email or web address] 2. Click on the First Time User? Click Here link in the Sign In box. You will see the New Member Sign Up page. 3. Enter your Placemeter Access Code exactly as it appears below. You will not need to use this code after youve completed the sign-up process. If you do not sign up before the expiration date, you must request a new code. · Placemeter Access Code: [email or web address] Expires: 12/20/2018  4:42 PM 
 
4. Enter the last four digits of your Social Security Number (xxxx) and Date of Birth (mm/dd/yyyy) as indicated and click Submit. You will be taken to the next sign-up page. 5. Create a Placemeter ID. This will be your Placemeter login ID and cannot be changed, so think of one that is secure and easy to remember. 6. Create a Placemeter password. You can change your password at any time. 7. Enter your Password Reset Question and Answer. This can be used at a later time if you forget your password. 8. Enter your e-mail address. You will receive e-mail notification when new information is available in [address] 9. Click Sign Up. You can now view and download portions of your medical record. 10. Click the Download Summary menu link to download a portable copy of your medical information.  
 
If you have questions, please visit the Frequently Asked Questions section of the [employer] Mobile. Remember, [employer] is NOT to be used for urgent needs. For medical emergencies, dial 911. Now available from your iPhone and Android! Please provide this summary of care documentation to your next provider. Your primary care clinician is listed as Sydney Varela. If you have any questions after today's visit, please call [phone].

## 2018-11-15 ENCOUNTER — OFFICE VISIT (OUTPATIENT)
Dept: FAMILY MEDICINE CLINIC | Age: 48
End: 2018-11-15

## 2018-11-15 VITALS
BODY MASS INDEX: 37.22 KG/M2 | SYSTOLIC BLOOD PRESSURE: 130 MMHG | RESPIRATION RATE: 18 BRPM | DIASTOLIC BLOOD PRESSURE: 79 MMHG | HEIGHT: 70 IN | HEART RATE: 93 BPM | WEIGHT: 260 LBS | TEMPERATURE: 98.5 F

## 2018-11-15 DIAGNOSIS — J06.9 VIRAL UPPER RESPIRATORY TRACT INFECTION: Primary | ICD-10-CM

## 2018-11-15 DIAGNOSIS — R73.03 PREDIABETES: ICD-10-CM

## 2018-11-15 RX ORDER — AZITHROMYCIN 500 MG/1
500 TABLET, FILM COATED ORAL DAILY
Qty: 3 TAB | Refills: 0 | Status: SHIPPED | OUTPATIENT
Start: 2018-11-15 | End: 2018-11-18

## 2018-11-15 RX ORDER — METFORMIN HYDROCHLORIDE 500 MG/1
500 TABLET, EXTENDED RELEASE ORAL
Qty: 90 TAB | Refills: 0 | Status: SHIPPED | OUTPATIENT
Start: 2018-11-15 | End: 2019-02-22 | Stop reason: SDUPTHER

## 2018-11-15 RX ORDER — AZITHROMYCIN 500 MG/1
500 TABLET, FILM COATED ORAL DAILY
Qty: 3 TAB | Refills: 0 | Status: SHIPPED | OUTPATIENT
Start: 2018-11-15 | End: 2018-11-15 | Stop reason: SDUPTHER

## 2018-11-15 NOTE — PROGRESS NOTES
HISTORY OF PRESENT ILLNESS  Estrella Carpenter is a 46 y.o. male. Estrella Carpenter presents with sinus pain and nasal congestion for the past 4 days. It is getting worse. Hot showers make it better. He tried Mucinex and Benadryl last night which helped. Low humidity makes it worse. Review of Systems   Constitutional: Positive for malaise/fatigue. Negative for chills and fever. HENT: Positive for sinus pain and sore throat. Negative for congestion and ear pain. Mucous is green in color. There is sinus pain. Eyes: Positive for discharge and redness. Watery, itching eyes   Respiratory: Positive for sputum production. Negative for cough, hemoptysis, shortness of breath and wheezing. His sputum is green   Cardiovascular: Negative for chest pain. Neurological: Positive for headaches. Visit Vitals  /79   Pulse 93   Temp 98.5 °F (36.9 °C) (Oral)   Resp 18   Ht 5' 10\" (1.778 m)   Wt 260 lb (117.9 kg)   BMI 37.31 kg/m²     Physical Exam   Constitutional: He is oriented to person, place, and time. He appears well-developed and well-nourished. No distress. HENT:   Head: Normocephalic. Right Ear: Tympanic membrane, external ear and ear canal normal.   Left Ear: Tympanic membrane, external ear and ear canal normal.   Nose: Nose normal. Right sinus exhibits no maxillary sinus tenderness and no frontal sinus tenderness. Left sinus exhibits no maxillary sinus tenderness and no frontal sinus tenderness. Mouth/Throat: Uvula is midline, oropharynx is clear and moist and mucous membranes are normal.   Eyes: Right eye exhibits no discharge. Left eye exhibits no discharge. Right conjunctiva is not injected. Left conjunctiva is not injected. Cardiovascular: Normal rate, regular rhythm and normal heart sounds. Exam reveals no gallop and no friction rub. No murmur heard. Pulmonary/Chest: Effort normal and breath sounds normal. No respiratory distress. He has no wheezes.  He has no rales.   Lymphadenopathy:     He has no cervical adenopathy. Neurological: He is alert and oriented to person, place, and time. Skin: Skin is warm and dry. He is not diaphoretic. Nursing note and vitals reviewed. ASSESSMENT and PLAN    ICD-10-CM ICD-9-CM    1. Viral upper respiratory tract infection J06.9 465.9 chlorpheniramine-dm (CORICIDIN HBP COUGH AND COLD) 4-30 mg tab      azithromycin (ZITHROMAX) 500 mg tab      DISCONTINUED: azithromycin (ZITHROMAX) 500 mg tab   2. Prediabetes R73.03 790.29 metFORMIN ER (GLUCOPHAGE XR) 500 mg tablet        URI  Rest, push fluids  Coricidin HBP prn  Prescription for Zithromax given if he is not better in 4 days  Refills per orders    Follow-up Disposition:  Return if symptoms worsen or fail to improve. Reviewed plan of care. Patient has provided input and agrees with goals.

## 2018-11-15 NOTE — PROGRESS NOTES
Chief Complaint   Patient presents with    Sinus Pain     and nasal drainage x 4 days   Pt needs refill to mailorder for Metformin. Currently set at local pharmacy due to sinus symptoms. 1. Have you been to the ER, urgent care clinic since your last visit? Hospitalized since your last visit? No     2. Have you seen or consulted any other health care providers outside of the [address] [place] [address] since your last visit? Include any pap smears or colon screening.  No

## 2018-12-21 DIAGNOSIS — I10 ESSENTIAL HYPERTENSION: ICD-10-CM

## 2018-12-21 DIAGNOSIS — E78.5 HYPERLIPIDEMIA, UNSPECIFIED HYPERLIPIDEMIA TYPE: ICD-10-CM

## 2018-12-21 DIAGNOSIS — R73.01 IFG (IMPAIRED FASTING GLUCOSE): ICD-10-CM

## 2019-01-21 DIAGNOSIS — E78.00 HYPERCHOLESTEROLEMIA: ICD-10-CM

## 2019-01-25 RX ORDER — MONTELUKAST SODIUM 10 MG/1
TABLET ORAL
Qty: 90 TAB | Refills: 1 | Status: SHIPPED | OUTPATIENT
Start: 2019-01-25 | End: 2019-06-30 | Stop reason: SDUPTHER

## 2019-01-25 RX ORDER — FENOFIBRATE 160 MG/1
TABLET ORAL
Qty: 90 TAB | Refills: 0 | Status: SHIPPED | OUTPATIENT
Start: 2019-01-25 | End: 2019-04-09 | Stop reason: SDUPTHER

## 2019-01-26 RX ORDER — SIMVASTATIN 40 MG/1
TABLET, FILM COATED ORAL
Qty: 90 TAB | Refills: 0 | Status: SHIPPED | OUTPATIENT
Start: 2019-01-26 | End: 2019-04-09 | Stop reason: SDUPTHER

## 2019-01-28 NOTE — TELEPHONE ENCOUNTER
Pt informed. Pt verbalizes understanding. Pt states he will get his labs done this week as he has an appt coming up next week w/ Dr. Tory Lewis.

## 2019-02-02 RX ORDER — SERTRALINE HYDROCHLORIDE 100 MG/1
TABLET, FILM COATED ORAL
Qty: 90 TAB | Refills: 0 | Status: SHIPPED | OUTPATIENT
Start: 2019-02-02 | End: 2019-04-16 | Stop reason: SDUPTHER

## 2019-02-14 LAB
ALBUMIN SERPL-MCNC: 4.5 G/DL (ref 3.5–5.5)
ALBUMIN/GLOB SERPL: 1.8 {RATIO} (ref 1.2–2.2)
ALP SERPL-CCNC: 51 IU/L (ref 39–117)
ALT SERPL-CCNC: 29 IU/L (ref 0–44)
AST SERPL-CCNC: 20 IU/L (ref 0–40)
BILIRUB SERPL-MCNC: <0.2 MG/DL (ref 0–1.2)
BUN SERPL-MCNC: 12 MG/DL (ref 6–24)
BUN/CREAT SERPL: 13 (ref 9–20)
CALCIUM SERPL-MCNC: 9.1 MG/DL (ref 8.7–10.2)
CHLORIDE SERPL-SCNC: 104 MMOL/L (ref 96–106)
CHOLEST SERPL-MCNC: 168 MG/DL (ref 100–199)
CO2 SERPL-SCNC: 23 MMOL/L (ref 20–29)
CREAT SERPL-MCNC: 0.89 MG/DL (ref 0.76–1.27)
EST. AVERAGE GLUCOSE BLD GHB EST-MCNC: 151 MG/DL
GLOBULIN SER CALC-MCNC: 2.5 G/DL (ref 1.5–4.5)
GLUCOSE SERPL-MCNC: 150 MG/DL (ref 65–99)
HBA1C MFR BLD: 6.9 % (ref 4.8–5.6)
HDLC SERPL-MCNC: 46 MG/DL
INTERPRETATION, 910389: NORMAL
LDLC SERPL CALC-MCNC: 95 MG/DL (ref 0–99)
Lab: NORMAL
POTASSIUM SERPL-SCNC: 4.3 MMOL/L (ref 3.5–5.2)
PROT SERPL-MCNC: 7 G/DL (ref 6–8.5)
SODIUM SERPL-SCNC: 142 MMOL/L (ref 134–144)
TRIGL SERPL-MCNC: 136 MG/DL (ref 0–149)
VLDLC SERPL CALC-MCNC: 27 MG/DL (ref 5–40)

## 2019-02-14 NOTE — PROGRESS NOTES
Slightly worse diabetic control but still in acceptable range. Recommend weight loss, increased exercise and minding diet. If worse at next check may consider changing medications but we should recheck in 3-4 months and make that determination.

## 2019-02-22 ENCOUNTER — OFFICE VISIT (OUTPATIENT)
Dept: FAMILY MEDICINE CLINIC | Age: 49
End: 2019-02-22

## 2019-02-22 VITALS
DIASTOLIC BLOOD PRESSURE: 78 MMHG | HEIGHT: 70 IN | WEIGHT: 259 LBS | BODY MASS INDEX: 37.08 KG/M2 | SYSTOLIC BLOOD PRESSURE: 140 MMHG | HEART RATE: 87 BPM | TEMPERATURE: 97.8 F | RESPIRATION RATE: 18 BRPM

## 2019-02-22 DIAGNOSIS — E11.9 CONTROLLED TYPE 2 DIABETES MELLITUS WITHOUT COMPLICATION, WITHOUT LONG-TERM CURRENT USE OF INSULIN (HCC): ICD-10-CM

## 2019-02-22 DIAGNOSIS — E78.00 HYPERCHOLESTEROLEMIA: ICD-10-CM

## 2019-02-22 DIAGNOSIS — I10 ESSENTIAL HYPERTENSION: Primary | ICD-10-CM

## 2019-02-22 DIAGNOSIS — L85.3 XEROSIS CUTIS: ICD-10-CM

## 2019-02-22 DIAGNOSIS — Z12.11 SCREENING FOR COLON CANCER: ICD-10-CM

## 2019-02-22 DIAGNOSIS — R73.03 PREDIABETES: ICD-10-CM

## 2019-02-22 DIAGNOSIS — E66.9 OBESITY (BMI 30-39.9): ICD-10-CM

## 2019-02-22 DIAGNOSIS — E66.01 SEVERE OBESITY WITH BODY MASS INDEX (BMI) OF 35.0 TO 39.9 WITH SERIOUS COMORBIDITY (HCC): ICD-10-CM

## 2019-02-22 RX ORDER — METFORMIN HYDROCHLORIDE 750 MG/1
750 TABLET, EXTENDED RELEASE ORAL
Qty: 90 TAB | Refills: 1 | Status: SHIPPED | OUTPATIENT
Start: 2019-02-22 | End: 2019-02-22

## 2019-02-22 RX ORDER — LISINOPRIL 5 MG/1
5 TABLET ORAL DAILY
Qty: 90 TAB | Refills: 1 | Status: SHIPPED | OUTPATIENT
Start: 2019-02-22 | End: 2019-09-18 | Stop reason: SDUPTHER

## 2019-02-22 RX ORDER — METFORMIN HYDROCHLORIDE 750 MG/1
750 TABLET, EXTENDED RELEASE ORAL
Qty: 90 TAB | Refills: 1 | Status: SHIPPED | OUTPATIENT
Start: 2019-02-22 | End: 2019-09-18 | Stop reason: SDUPTHER

## 2019-02-22 NOTE — PATIENT INSTRUCTIONS
For dry skin use amlactin (over the counter in the lotion section, has lactic acid in it to soften and slough off dry skin)--not on broken skin, not after shaving, not between toes Starting to increase metformin to 750 Starting lisinopril for kidney protection Foot exam done today and Normal, established baseline Labs in 3-4 months, include microalbumin at that time

## 2019-02-22 NOTE — PROGRESS NOTES
Family Medicine Follow-Up Progress Note Patient: Declan Santoro 1970, 46 y.o., male Encounter Date: 2019 ASSESSMENT & PLAN Orders Placed This Encounter  MICROALBUMIN, UR, RAND W/ MICROALB/CREAT RATIO Standing Status:   Future Standing Expiration Date:   2020  CBC W/O DIFF Standing Status:   Future Standing Expiration Date:   2020  
 HEMOGLOBIN A1C WITH EAG Standing Status:   Future Standing Expiration Date:   2020  METABOLIC PANEL, COMPREHENSIVE Standing Status:   Future Standing Expiration Date:   2020  [facility] A[facility] Referral Priority:   Routine Referral Type:   Consultation Referral Reason:   Specialty Services Required Referral Location:   [facility]  
  Referred to Provider:   Santiago June MD  
  Number of Visits Requested:   1  
  DIABETES FOOT EXAM  
 varicella-zoster recombinant, PF, (SHINGRIX) 50 mcg/0.5 mL susr injection Si.5 mL by IntraMUSCular route once for 1 dose. Dispense:  0.5 mL Refill:  0  
 metFORMIN ER (GLUCOPHAGE XR) 750 mg tablet Sig: Take 1 Tab by mouth daily (with dinner). Dispense:  90 Tab Refill:  1  
 lisinopril (PRINIVIL, ZESTRIL) 5 mg tablet Sig: Take 1 Tab by mouth daily. Dispense:  90 Tab Refill:  1 ICD-10-CM ICD-9-CM 1. Essential hypertension I10 401.9 2. Obesity (BMI 30-39. 9) E66.9 278.00   
3. Controlled type 2 diabetes mellitus without complication, without long-term current use of insulin (HCC) E11.9 250.00 MICROALBUMIN, UR, RAND W/ MICROALB/CREAT RATIO  
    DIABETES FOOT EXAM  
   CBC W/O DIFF  
   HEMOGLOBIN A1C WITH EAG  
   METABOLIC PANEL, COMPREHENSIVE 4. Hypercholesterolemia E78.00 272.0 5. Severe obesity with body mass index (BMI) of 35.0 to 39.9 with serious comorbidity (HCC) E66.01 278.01   
6. Prediabetes R73.03 790.29 metFORMIN ER (GLUCOPHAGE XR) 750 mg tablet 7. Screening for colon cancer Z12.11 V76.51 REFERRAL TO GASTROENTEROLOGY Patient Instructions For dry skin use amlactin (over the counter in the lotion section, has lactic acid in it to soften and slough off dry skin)--not on broken skin, not after shaving, not between toes Starting to increase metformin to 750 Starting lisinopril for kidney protection Foot exam done today and Normal, established baseline Labs in 3-4 months, include microalbumin at that time CHIEF COMPLAINT Chief Complaint Patient presents with  Hypertension  
  follow up  Diabetes  
  follow up SUBJECTIVE Darby Mobley is a 46 y.o. male presenting today for follow up of chronic issues. Doing well overall. Not checking sugars at home. Not checking blood pressures at home Taking all medications as prescribed Since last visit he went to be seen at [facility]-had a sinus infection and ear infection once and was given augmentin and prednisone which cleared things up for him. About a week later he went back and they told him it was a URI with an ear infection again--last time he was seen was 3 wk ago. Incremental increase in a1c over the last few years but recently has had a increase and is now firmly in dm territory and out of prediabetes. Lab Results Component Value Date/Time Hemoglobin A1c 6.9 (H) 02/13/2019 08:12 AM  
 Hemoglobin A1c 6.5 (H) 09/18/2018 09:04 AM  
 Hemoglobin A1c 6.4 (H) 06/06/2017 08:42 AM  
 
 
Review of Systems A 12 point review of systems was negative except as noted here or in the HPI. OBJECTIVE Visit Vitals /78 Pulse 87 Temp 97.8 °F (36.6 °C) (Oral) Resp 18 Ht 5' 10\" (1.778 m) Wt 259 lb (117.5 kg) BMI 37.16 kg/m² Physical Exam  
Constitutional: He is oriented to person, place, and time. He appears well-developed and well-nourished. No distress. NAD, Nontoxic, Appears Stated Age HENT:  
Head: Normocephalic and atraumatic. Mouth/Throat: Oropharynx is clear and moist.  
Eyes: Conjunctivae and EOM are normal. Right eye exhibits no discharge. Left eye exhibits no discharge. No scleral icterus. Neck: Neck supple. Cardiovascular: Normal rate, regular rhythm and normal heart sounds. No murmur heard. Pulmonary/Chest: Effort normal and breath sounds normal. No stridor. No respiratory distress. He has no wheezes. He has no rales. Abdominal: Soft. Bowel sounds are normal. He exhibits no distension. There is no tenderness. obese Musculoskeletal: He exhibits no edema or tenderness. Neurological: He is alert and oriented to person, place, and time. Grossly intact CN Skin: Skin is warm and dry. No rash noted. He is not diaphoretic. Xerosis cutis on feet Psychiatric: He has a normal mood and affect. His behavior is normal.  
Nursing note and vitals reviewed. No results found for any visits on 02/22/19. HISTORICAL Reviewed and updated today, and as noted below: 
 
Past Medical History:  
Diagnosis Date  Allergic rhinitis  Anxiety  Essential hypertension 8/1/2014  Family history of premature CAD 8/1/2014  
 HTN (hypertension) 8/1/2014  Hyperlipidemia  Hypertension  Obesity 9/23/2014  ROB (obstructive sleep apnea) 3/18/2016  Prediabetes 12/18/2015 Past Surgical History:  
Procedure Laterality Date  HX HEENT    
 wisdom teeth removal  
 
Family History Problem Relation Age of Onset  Cancer Mother   
     lung  Diabetes Father  Heart Disease Father 62 CAD  Hypertension Father Social History Tobacco Use Smoking Status Never Smoker Smokeless Tobacco Never Used Social History Socioeconomic History  Marital status:  Spouse name: Not on file  Number of children: Not on file  Years of education: Not on file  Highest education level: Not on file Tobacco Use  Smoking status: Never Smoker  Smokeless tobacco: Never Used Substance and Sexual Activity  Alcohol use: No  
 Drug use: No  
 Sexual activity: Yes  
  Partners: Female No Known Allergies Orders Only on 12/21/2018 Component Date Value Ref Range Status  Cholesterol, total 02/13/2019 168  100 - 199 mg/dL Final  
 Triglyceride 02/13/2019 136  0 - 149 mg/dL Final  
 HDL Cholesterol 02/13/2019 46  >39 mg/dL Final  
 VLDL, calculated 02/13/2019 27  5 - 40 mg/dL Final  
 LDL, calculated 02/13/2019 95  0 - 99 mg/dL Final  
 Glucose 02/13/2019 150* 65 - 99 mg/dL Final  
 BUN 02/13/2019 12  6 - 24 mg/dL Final  
 Creatinine 02/13/2019 0.89  0.76 - 1.27 mg/dL Final  
 GFR est non-AA 02/13/2019 100  >59 mL/min/1.73 Final  
 GFR est AA 02/13/2019 115  >59 mL/min/1.73 Final  
 BUN/Creatinine ratio 02/13/2019 13  9 - 20 Final  
 Sodium 02/13/2019 142  134 - 144 mmol/L Final  
 Potassium 02/13/2019 4.3  3.5 - 5.2 mmol/L Final  
 Chloride 02/13/2019 104  96 - 106 mmol/L Final  
 CO2 02/13/2019 23  20 - 29 mmol/L Final  
 Calcium 02/13/2019 9.1  8.7 - 10.2 mg/dL Final  
 Protein, total 02/13/2019 7.0  6.0 - 8.5 g/dL Final  
 Albumin 02/13/2019 4.5  3.5 - 5.5 g/dL Final  
 GLOBULIN, TOTAL 02/13/2019 2.5  1.5 - 4.5 g/dL Final  
 A-G Ratio 02/13/2019 1.8  1.2 - 2.2 Final  
 Bilirubin, total 02/13/2019 <0.2  0.0 - 1.2 mg/dL Final  
 Alk. phosphatase 02/13/2019 51  39 - 117 IU/L Final  
 AST (SGOT) 02/13/2019 20  0 - 40 IU/L Final  
 ALT (SGPT) 02/13/2019 29  0 - 44 IU/L Final  
 Hemoglobin A1c 02/13/2019 6.9* 4.8 - 5.6 % Final  
 Comment:          Prediabetes: 5.7 - 6.4 Diabetes: >6.4 Glycemic control for adults with diabetes: <7.0  Estimated average glucose 02/13/2019 151  mg/dL Final  
 INTERPRETATION 02/13/2019 Note   Final  
 Supplemental report is available.  PDF Image [id number] Not applicable   Final  
 
 
 
Concha Craig MD 
[address] 02/22/19 8:08 AM 
 
 Portions of this note may have been populated using smart dictation software and may have \"sounds-like\" errors present. Pt was counseled on risks, benefits and alternatives of treatment options. All questions were asked and answered and the patient was agreeable with the treatment plan as outlined.

## 2019-04-09 DIAGNOSIS — E78.00 HYPERCHOLESTEROLEMIA: ICD-10-CM

## 2019-04-12 RX ORDER — FENOFIBRATE 160 MG/1
TABLET ORAL
Qty: 90 TAB | Refills: 3 | Status: SHIPPED | OUTPATIENT
Start: 2019-04-12 | End: 2019-09-18 | Stop reason: SDUPTHER

## 2019-04-12 RX ORDER — SIMVASTATIN 40 MG/1
TABLET, FILM COATED ORAL
Qty: 90 TAB | Refills: 3 | Status: SHIPPED | OUTPATIENT
Start: 2019-04-12 | End: 2020-02-11

## 2019-04-19 RX ORDER — SERTRALINE HYDROCHLORIDE 100 MG/1
TABLET, FILM COATED ORAL
Qty: 90 TAB | Refills: 1 | Status: SHIPPED | OUTPATIENT
Start: 2019-04-19 | End: 2019-09-06 | Stop reason: SDUPTHER

## 2019-05-22 DIAGNOSIS — E11.9 CONTROLLED TYPE 2 DIABETES MELLITUS WITHOUT COMPLICATION, WITHOUT LONG-TERM CURRENT USE OF INSULIN (HCC): ICD-10-CM

## 2019-07-01 RX ORDER — MONTELUKAST SODIUM 10 MG/1
TABLET ORAL
Qty: 90 TAB | Refills: 1 | Status: SHIPPED | OUTPATIENT
Start: 2019-07-01 | End: 2019-11-18 | Stop reason: SDUPTHER

## 2019-09-09 ENCOUNTER — TELEPHONE (OUTPATIENT)
Dept: FAMILY MEDICINE CLINIC | Age: 49
End: 2019-09-09

## 2019-09-09 RX ORDER — SERTRALINE HYDROCHLORIDE 100 MG/1
TABLET, FILM COATED ORAL
Qty: 90 TAB | Refills: 0 | Status: SHIPPED | OUTPATIENT
Start: 2019-09-09 | End: 2019-11-30 | Stop reason: SDUPTHER

## 2019-09-09 NOTE — TELEPHONE ENCOUNTER
Called and spoke with pt, and he has been advised and states understanding of recommendation and agrees with plan.

## 2019-09-09 NOTE — TELEPHONE ENCOUNTER
From my review of the chart the patient is only on metformin.   Since this is to be taken with food I would go ahead and defer taking his metformin while he is doing his colonoscopy prep and then he can resume when he is back eating solids again

## 2019-09-09 NOTE — TELEPHONE ENCOUNTER
Pt called office in regards to his diabetic medication. Per pt he is having his colonscopy on 09/13/2019 and needs to know if he needs to take his diabetic medication or not.

## 2019-09-13 LAB
ALBUMIN SERPL-MCNC: 4.4 G/DL (ref 3.5–5.5)
ALBUMIN/CREAT UR: 6.7 MG/G CREAT (ref 0–30)
ALBUMIN/GLOB SERPL: 1.8 {RATIO} (ref 1.2–2.2)
ALP SERPL-CCNC: 44 IU/L (ref 39–117)
ALT SERPL-CCNC: 23 IU/L (ref 0–44)
AST SERPL-CCNC: 21 IU/L (ref 0–40)
BILIRUB SERPL-MCNC: 0.3 MG/DL (ref 0–1.2)
BUN SERPL-MCNC: 19 MG/DL (ref 6–24)
BUN/CREAT SERPL: 23 (ref 9–20)
CALCIUM SERPL-MCNC: 9.7 MG/DL (ref 8.7–10.2)
CHLORIDE SERPL-SCNC: 101 MMOL/L (ref 96–106)
CO2 SERPL-SCNC: 25 MMOL/L (ref 20–29)
COLONOSCOPY, EXTERNAL: NORMAL
CREAT SERPL-MCNC: 0.84 MG/DL (ref 0.76–1.27)
CREAT UR-MCNC: 173 MG/DL
ERYTHROCYTE [DISTWIDTH] IN BLOOD BY AUTOMATED COUNT: 12.7 % (ref 12.3–15.4)
EST. AVERAGE GLUCOSE BLD GHB EST-MCNC: 137 MG/DL
GLOBULIN SER CALC-MCNC: 2.4 G/DL (ref 1.5–4.5)
GLUCOSE SERPL-MCNC: 130 MG/DL (ref 65–99)
HBA1C MFR BLD: 6.4 % (ref 4.8–5.6)
HCT VFR BLD AUTO: 40.6 % (ref 37.5–51)
HGB BLD-MCNC: 13.1 G/DL (ref 13–17.7)
MCH RBC QN AUTO: 28.2 PG (ref 26.6–33)
MCHC RBC AUTO-ENTMCNC: 32.3 G/DL (ref 31.5–35.7)
MCV RBC AUTO: 87 FL (ref 79–97)
MICROALBUMIN UR-MCNC: 11.6 UG/ML
PLATELET # BLD AUTO: 227 X10E3/UL (ref 150–450)
POTASSIUM SERPL-SCNC: 4.3 MMOL/L (ref 3.5–5.2)
PROT SERPL-MCNC: 6.8 G/DL (ref 6–8.5)
RBC # BLD AUTO: 4.65 X10E6/UL (ref 4.14–5.8)
SODIUM SERPL-SCNC: 140 MMOL/L (ref 134–144)
WBC # BLD AUTO: 5.7 X10E3/UL (ref 3.4–10.8)

## 2019-09-16 ENCOUNTER — TELEPHONE (OUTPATIENT)
Dept: FAMILY MEDICINE CLINIC | Age: 49
End: 2019-09-16

## 2019-09-16 NOTE — TELEPHONE ENCOUNTER
----- Message from Leonor Grimes MD sent at 9/16/2019 10:10 AM EDT -----  Urine protein levels are normal  Blood counts are all normal  A1c is improved from last check from 6.9 down to 6.4.   Great job  Electrolytes, kidney and liver functions are normal

## 2019-09-16 NOTE — PROGRESS NOTES
Urine protein levels are normal  Blood counts are all normal  A1c is improved from last check from 6.9 down to 6.4.   Great job  Electrolytes, kidney and liver functions are normal

## 2019-09-16 NOTE — TELEPHONE ENCOUNTER
Returned pt's call and he has been advised and states understanding of results. Pt states he will keep his 09/18/2019 scheduled appointment.

## 2019-09-18 ENCOUNTER — OFFICE VISIT (OUTPATIENT)
Dept: FAMILY MEDICINE CLINIC | Age: 49
End: 2019-09-18

## 2019-09-18 VITALS
TEMPERATURE: 98.1 F | SYSTOLIC BLOOD PRESSURE: 106 MMHG | WEIGHT: 254 LBS | DIASTOLIC BLOOD PRESSURE: 70 MMHG | HEART RATE: 68 BPM | HEIGHT: 70 IN | RESPIRATION RATE: 18 BRPM | BODY MASS INDEX: 36.36 KG/M2 | OXYGEN SATURATION: 100 %

## 2019-09-18 DIAGNOSIS — E66.01 SEVERE OBESITY WITH BODY MASS INDEX (BMI) OF 35.0 TO 39.9 WITH SERIOUS COMORBIDITY (HCC): ICD-10-CM

## 2019-09-18 DIAGNOSIS — E11.9 CONTROLLED TYPE 2 DIABETES MELLITUS WITHOUT COMPLICATION, WITHOUT LONG-TERM CURRENT USE OF INSULIN (HCC): ICD-10-CM

## 2019-09-18 DIAGNOSIS — I10 ESSENTIAL HYPERTENSION: Primary | ICD-10-CM

## 2019-09-18 DIAGNOSIS — G47.33 OSA (OBSTRUCTIVE SLEEP APNEA): ICD-10-CM

## 2019-09-18 DIAGNOSIS — E78.00 HYPERCHOLESTEROLEMIA: ICD-10-CM

## 2019-09-18 RX ORDER — FENOFIBRATE 160 MG/1
TABLET ORAL
Qty: 90 TAB | Refills: 3 | Status: SHIPPED | OUTPATIENT
Start: 2019-09-18 | End: 2020-07-27

## 2019-09-18 RX ORDER — METFORMIN HYDROCHLORIDE 750 MG/1
750 TABLET, EXTENDED RELEASE ORAL
Qty: 90 TAB | Refills: 1 | Status: SHIPPED | OUTPATIENT
Start: 2019-09-18 | End: 2020-02-11

## 2019-09-18 RX ORDER — METFORMIN HYDROCHLORIDE 750 MG/1
750 TABLET, EXTENDED RELEASE ORAL
Qty: 90 TAB | Refills: 1 | Status: SHIPPED | OUTPATIENT
Start: 2019-09-18 | End: 2019-09-18 | Stop reason: SDUPTHER

## 2019-09-18 RX ORDER — LISINOPRIL 5 MG/1
5 TABLET ORAL DAILY
Qty: 90 TAB | Refills: 1 | Status: SHIPPED | OUTPATIENT
Start: 2019-09-18 | End: 2020-02-11

## 2019-09-18 RX ORDER — LISINOPRIL 5 MG/1
5 TABLET ORAL DAILY
Qty: 90 TAB | Refills: 1 | Status: SHIPPED | OUTPATIENT
Start: 2019-09-18 | End: 2019-09-18 | Stop reason: SDUPTHER

## 2019-09-18 RX ORDER — FENOFIBRATE 160 MG/1
TABLET ORAL
Qty: 90 TAB | Refills: 3 | Status: SHIPPED | OUTPATIENT
Start: 2019-09-18 | End: 2019-09-18 | Stop reason: SDUPTHER

## 2019-09-18 NOTE — PATIENT INSTRUCTIONS
bp well controlled, stay the course with lisinopril now  hld--c/w current medications without changes  Eat a healthy diet  Exercise regularly  ROB--re eval pressures, eval with sleep med, take machine with you  DM--better control with Metformin dose, refills sent  Obestiy--pt discussed interest in eval with Bariatric surgery, we will make referral, does have comorbidities which would likely be modified with weight loss    6 m follow up  Call 2 wk before next visit for labs to be ordered p/t that visit  Shots as ordered at pharmacy

## 2019-09-18 NOTE — PROGRESS NOTES
Family Medicine Follow-Up Progress Note  Patient: Narayan Castro  1970, 44 y.o., male  Encounter Date: 2019    ASSESSMENT & PLAN    ICD-10-CM ICD-9-CM    1. Essential hypertension I10 401.9 REFERRAL TO BARIATRIC SURGERY   2. Controlled type 2 diabetes mellitus without complication, without long-term current use of insulin (HCC) E11.9 250.00 metFORMIN ER (GLUCOPHAGE XR) 750 mg tablet      REFERRAL TO BARIATRIC SURGERY      DISCONTINUED: metFORMIN ER (GLUCOPHAGE XR) 750 mg tablet   3. Severe obesity with body mass index (BMI) of 35.0 to 39.9 with serious comorbidity ([facility] [address] E66.01 278.01 SLEEP MEDICINE REFERRAL      REFERRAL TO BARIATRIC SURGERY   4. Hypercholesterolemia E78.00 272.0 fenofibrate (LOFIBRA) 160 mg tablet      REFERRAL TO BARIATRIC SURGERY      DISCONTINUED: fenofibrate (LOFIBRA) 160 mg tablet   5. ROB (obstructive sleep apnea) G47.33 327.23 SLEEP MEDICINE REFERRAL      REFERRAL TO BARIATRIC SURGERY       Orders Placed This Encounter    SLEEP MEDICINE REFERRAL     Referral Priority:   Routine     Referral Type:   Consultation     Referral Reason:   Specialty Services Required     Referred to Provider:   Janie Shah MD     Requested Specialty:   Sleep Medicine     Number of Visits Requested:   1    [facility] Bariatric Surgery [facility]     Referral Priority:   Routine     Referral Type:   Consultation     Referral Reason:   Specialty Services Required     Referred to Provider:   Susy Marquis MD     Number of Visits Requested:   1    varicella-zoster [facility] ADJUVANT COMPONENT-PF) injection     Si Each by IntraMUSCular route once for 1 dose. Indications: Prevention of Shingles     Dispense:  1 Each     Refill:  1    pneumococcal 23-valent (PNEUMOVAX 23) 25 mcg/0.5 mL injection     Si.5 mL by IntraMUSCular route once for 1 dose. Dispense:  0.5 mL     Refill:  0    DISCONTD: metFORMIN ER (GLUCOPHAGE XR) 750 mg tablet     Sig: Take 1 Tab by mouth daily (with dinner). Dispense:  90 Tab     Refill:  1    DISCONTD: fenofibrate (LOFIBRA) 160 mg tablet     Sig: TAKE 1 TABLET BY MOUTH  DAILY BEFORE BREAKFAST     Dispense:  90 Tab     Refill:  3    DISCONTD: lisinopril (PRINIVIL, ZESTRIL) 5 mg tablet     Sig: Take 1 Tab by mouth daily. Dispense:  90 Tab     Refill:  1    metFORMIN ER (GLUCOPHAGE XR) 750 mg tablet     Sig: Take 1 Tab by mouth daily (with dinner). Dispense:  90 Tab     Refill:  1    fenofibrate (LOFIBRA) 160 mg tablet     Sig: TAKE 1 TABLET BY MOUTH  DAILY BEFORE BREAKFAST     Dispense:  90 Tab     Refill:  3    lisinopril (PRINIVIL, ZESTRIL) 5 mg tablet     Sig: Take 1 Tab by mouth daily. Dispense:  90 Tab     Refill:  1       Patient Instructions   bp well controlled, stay the course with lisinopril now  hld--c/w current medications without changes  Eat a healthy diet  Exercise regularly  ROB--re eval pressures, eval with sleep med, take machine with you  DM--better control with Metformin dose, refills sent  Obestiy--pt discussed interest in eval with Bariatric surgery, we will make referral, does have comorbidities which would likely be modified with weight loss    6 m follow up  Call 2 wk before next visit for labs to be ordered p/t that visit  Shots as ordered at [address]  Chief Complaint   Patient presents with    Hypertension     Follow up    Diabetes       Qi Luciano is a 44 y.o. male presenting today for routine follow up. Recently had his labs done  Lab Results   Component Value Date/Time    Hemoglobin A1c 6.4 (H) 09/12/2019 10:49 AM    Hemoglobin A1c 6.9 (H) 02/13/2019 08:12 AM    Hemoglobin A1c 6.5 (H) 09/18/2018 09:04 AM    Glucose 130 (H) 09/12/2019 10:49 AM    Microalb/Creat ratio (ug/mg creat.) 6.7 09/12/2019 10:49 AM    LDL, calculated 95 02/13/2019 08:12 AM    Creatinine 0.84 09/12/2019 10:49 AM     A1c improving, microalbumin normal, lipids from last check were controlled.  NOrmal kidney functions as well. HTN: on lisinopril, tolerating without side effects   HLD: on zocor, tolerating without side effects  DM2: increased to metformin 750 xr with dinner at last visit, a1c responded well to this change  Patient is interested in possibly pursuing bariatric surgery. He knows he has comorbidities and that his weight is not changing with his efforts at home. He is not exercising right now but does try to follow a healthy diet. He reports his wife had a great result with bariatric surgery  Thinks his CPAP is not working as well as it should. Reports that it is old. Would like to see sleep medicine so that he could evaluate whether he needs a new machine or just perhaps different optimized settings    Review of Systems  A 12 point review of systems was negative except as noted here or in the HPI. OBJECTIVE  Visit Vitals  /70 (BP 1 Location: Left arm, BP Patient Position: Sitting)   Pulse 68   Temp 98.1 °F (36.7 °C) (Oral)   Resp 18   Ht 5' 10\" (1.778 m)   Wt 254 lb (115.2 kg)   SpO2 100%   BMI 36.45 kg/m²       Physical Exam   Constitutional: He is oriented to person, place, and time. He appears well-developed and well-nourished. No distress. NAD, Nontoxic, Appears Stated Age   HENT:   Head: Normocephalic and atraumatic. Mouth/Throat: Oropharynx is clear and moist.   Eyes: Conjunctivae and EOM are normal. Right eye exhibits no discharge. Left eye exhibits no discharge. No scleral icterus. Neck: Neck supple. Cardiovascular: Normal rate, regular rhythm and normal heart sounds. No murmur heard. Pulmonary/Chest: Effort normal and breath sounds normal. No stridor. No respiratory distress. He has no wheezes. He has no rales. Abdominal: Soft. Bowel sounds are normal. He exhibits no distension. There is no tenderness. obese   Musculoskeletal: He exhibits no edema or tenderness. Neurological: He is alert and oriented to person, place, and time.    Grossly intact CN   Skin: Skin is warm and dry. No rash noted. He is not diaphoretic. Psychiatric: He has a normal mood and affect. His behavior is normal.   Nursing note and vitals reviewed. No results found for any visits on 09/18/19. HISTORICAL  Reviewed and updated today, and as noted below:    Past Medical History:   Diagnosis Date    Allergic rhinitis     Anxiety     Essential hypertension 8/1/2014    Family history of premature CAD 8/1/2014    HTN (hypertension) 8/1/2014    Hyperlipidemia     Hypertension     Obesity 9/23/2014    ROB (obstructive sleep apnea) 3/18/2016    Prediabetes 12/18/2015     Past Surgical History:   Procedure Laterality Date    HX HEENT      wisdom teeth removal     Family History   Problem Relation Age of Onset    Cancer Mother         lung    Diabetes Father     Heart Disease Father 62        CAD    Hypertension Father      Social History     Tobacco Use   Smoking Status Never Smoker   Smokeless Tobacco Never Used     Social History     Socioeconomic History    Marital status:      Spouse name: Not on file    Number of children: Not on file    Years of education: Not on file    Highest education level: Not on file   Tobacco Use    Smoking status: Never Smoker    Smokeless tobacco: Never Used   Substance and Sexual Activity    Alcohol use: No    Drug use: No    Sexual activity: Yes     Partners: Female     No Known Allergies    Orders Only on 09/16/2019   Component Date Value Ref Range Status    Colonoscopy, External 09/13/2019 normal    Final         Jatin Roque MD  [facility]  09/18/19 8:08 AM    Portions of this note may have been populated using smart dictation software and may have \"sounds-like\" errors present. Pt was counseled on risks, benefits and alternatives of treatment options. All questions were asked and answered and the patient was agreeable with the treatment plan as outlined.

## 2019-11-04 ENCOUNTER — TELEPHONE (OUTPATIENT)
Dept: FAMILY MEDICINE CLINIC | Age: 49
End: 2019-11-04

## 2019-11-04 NOTE — TELEPHONE ENCOUNTER
Patient has a diagnosis of diabetes, A1c was documented at 6.5 which is the diabetic range, he is adequately controlled at this time in the prediabetic range however he has diabetes and should qualify for the vaccine.   I do recommend it but we should make sure his insurance covers it because it is 1 of the indications

## 2019-11-04 NOTE — TELEPHONE ENCOUNTER
Pt requesting call back from Dr. Shelby Russ or her nurse to find out how important she thinks it is for him to have the pneumonia vaccine she recommended since it's not covered by his insurance until age 70 or if diagnosed with diabetes and pt is prediabetic, which did not qualify. Pt did receive flu vaccine but wants call back to advise at [phone].  Ldm

## 2019-11-05 NOTE — TELEPHONE ENCOUNTER
Called and spoke with pt, and he has been advised and states understanding of recommendation. He will have pharmacist run medication as diabetic vs having pre diabetes.

## 2019-11-20 RX ORDER — MONTELUKAST SODIUM 10 MG/1
TABLET ORAL
Qty: 90 TAB | Refills: 3 | Status: SHIPPED | OUTPATIENT
Start: 2019-11-20 | End: 2020-10-22

## 2019-12-03 RX ORDER — SERTRALINE HYDROCHLORIDE 100 MG/1
TABLET, FILM COATED ORAL
Qty: 90 TAB | Refills: 0 | Status: SHIPPED | OUTPATIENT
Start: 2019-12-03 | End: 2020-03-12 | Stop reason: SDUPTHER

## 2020-02-10 DIAGNOSIS — E11.9 CONTROLLED TYPE 2 DIABETES MELLITUS WITHOUT COMPLICATION, WITHOUT LONG-TERM CURRENT USE OF INSULIN (HCC): ICD-10-CM

## 2020-02-11 RX ORDER — METFORMIN HYDROCHLORIDE 750 MG/1
TABLET, EXTENDED RELEASE ORAL
Qty: 90 TAB | Refills: 1 | Status: SHIPPED | OUTPATIENT
Start: 2020-02-11 | End: 2020-08-03

## 2020-02-11 RX ORDER — LISINOPRIL 5 MG/1
TABLET ORAL
Qty: 90 TAB | Refills: 1 | Status: SHIPPED | OUTPATIENT
Start: 2020-02-11 | End: 2020-08-03

## 2020-03-11 RX ORDER — SERTRALINE HYDROCHLORIDE 100 MG/1
TABLET, FILM COATED ORAL
Qty: 90 TAB | Refills: 0 | OUTPATIENT
Start: 2020-03-11

## 2020-03-12 RX ORDER — SERTRALINE HYDROCHLORIDE 100 MG/1
TABLET, FILM COATED ORAL
Qty: 90 TAB | Refills: 1 | Status: SHIPPED | OUTPATIENT
Start: 2020-03-12 | End: 2020-08-03

## 2020-03-16 ENCOUNTER — OFFICE VISIT (OUTPATIENT)
Dept: FAMILY MEDICINE CLINIC | Age: 50
End: 2020-03-16

## 2020-03-16 VITALS
DIASTOLIC BLOOD PRESSURE: 66 MMHG | HEIGHT: 70 IN | OXYGEN SATURATION: 100 % | HEART RATE: 78 BPM | WEIGHT: 262.2 LBS | RESPIRATION RATE: 18 BRPM | BODY MASS INDEX: 37.54 KG/M2 | TEMPERATURE: 98.4 F | SYSTOLIC BLOOD PRESSURE: 123 MMHG

## 2020-03-16 DIAGNOSIS — Z82.49 FAMILY HISTORY OF PREMATURE CAD: ICD-10-CM

## 2020-03-16 DIAGNOSIS — E11.9 COMPREHENSIVE DIABETIC FOOT EXAMINATION, TYPE 2 DM, ENCOUNTER FOR (HCC): ICD-10-CM

## 2020-03-16 DIAGNOSIS — E11.9 TYPE 2 DIABETES MELLITUS WITHOUT COMPLICATION, WITHOUT LONG-TERM CURRENT USE OF INSULIN (HCC): ICD-10-CM

## 2020-03-16 DIAGNOSIS — E78.00 HYPERCHOLESTEROLEMIA: ICD-10-CM

## 2020-03-16 DIAGNOSIS — I10 ESSENTIAL HYPERTENSION: ICD-10-CM

## 2020-03-16 DIAGNOSIS — G47.33 OSA (OBSTRUCTIVE SLEEP APNEA): ICD-10-CM

## 2020-03-16 DIAGNOSIS — J30.1 ALLERGIC RHINITIS DUE TO POLLEN, UNSPECIFIED SEASONALITY: Primary | ICD-10-CM

## 2020-03-16 DIAGNOSIS — E66.9 OBESITY (BMI 30-39.9): ICD-10-CM

## 2020-03-16 DIAGNOSIS — Z23 ENCOUNTER FOR IMMUNIZATION: ICD-10-CM

## 2020-03-16 DIAGNOSIS — E11.9 CONTROLLED TYPE 2 DIABETES MELLITUS WITHOUT COMPLICATION, WITHOUT LONG-TERM CURRENT USE OF INSULIN (HCC): ICD-10-CM

## 2020-03-16 RX ORDER — ADJUVANT AS01B/PF, VIAL 1 OF 2
1 VIAL (ML) INTRAMUSCULAR ONCE
Qty: 1 EACH | Refills: 1 | Status: SHIPPED | OUTPATIENT
Start: 2020-03-16 | End: 2020-03-16 | Stop reason: DRUGHIGH

## 2020-03-16 RX ORDER — ADJUVANT AS01B/PF, VIAL 1 OF 2
1 VIAL (ML) INTRAMUSCULAR ONCE
Qty: 1 EACH | Refills: 1 | Status: SHIPPED | OUTPATIENT
Start: 2020-03-16 | End: 2020-03-16

## 2020-03-16 NOTE — PROGRESS NOTES
Family Medicine Follow-Up Progress Note  Patient: Radha Lovett  1970, 44 y.o., male  Encounter Date: 3/16/2020    ASSESSMENT & PLAN    ICD-10-CM ICD-9-CM    1. Allergic rhinitis due to pollen, unspecified seasonality J30.1 477.0    2. Comprehensive diabetic foot examination, type 2 DM, encounter for ([facility] [address] E11.9 250.00  DIABETES FOOT EXAM   3. Type 2 diabetes mellitus without complication, without long-term current use of insulin (HCC) E11.9 250.00 HEMOGLOBIN A1C WITH EAG      CBC W/O DIFF      METABOLIC PANEL, COMPREHENSIVE      LIPID PANEL   4. Encounter for immunization Z23 V03.89 PNEUMOCOCCAL POLYSACCHARIDE VACCINE, 23-VALENT, ADULT OR IMMUNOSUPPRESSED PT DOSE,      ADMIN PNEUMOCOCCAL VACCINE   5. Controlled type 2 diabetes mellitus without complication, without long-term current use of insulin (HCC) E11.9 250.00 HEMOGLOBIN A1C WITH EAG      CBC W/O DIFF      METABOLIC PANEL, COMPREHENSIVE   6. Essential hypertension I10 401.9 CBC W/O DIFF      METABOLIC PANEL, COMPREHENSIVE   7. Hypercholesterolemia I12.82 324.4 METABOLIC PANEL, COMPREHENSIVE      LIPID PANEL   8. Family history of premature CAD Z82.49 V17.3    9. Obesity (BMI 30-39. 9) E66.9 278.00    10.  ROB (obstructive sleep apnea) G47.33 327.23 SLEEP MEDICINE REFERRAL       Orders Placed This Encounter    Pneumococcal Polysaccharide Vaccine (PPSV-23)    HEMOGLOBIN A1C WITH EAG     Standing Status:   Future     Standing Expiration Date:   3/16/2021    CBC W/O DIFF     Standing Status:   Future     Standing Expiration Date:   2/04/1414    METABOLIC PANEL, COMPREHENSIVE     Standing Status:   Future     Standing Expiration Date:   3/16/2021    LIPID PANEL     Standing Status:   Future     Standing Expiration Date:   3/16/2021   [facility] SLEEP MEDICINE REFERRAL     Referral Priority:   Routine     Referral Type:   Consultation     Referral Reason:   Specialty Services Required     Referred to Provider:   Brad Garcia MD     Number of Visits Requested:   1     DIABETES FOOT EXAM    Pneumococcal Admin () (PLEASE INCLUDE THIS ADMIN CHARGE)    varicella-zoster (Shingrix Adjuvant Component-PF) injection     Si Each by IntraMUSCular route once for 1 dose. Indications: prevention of shingles     Dispense:  1 Each     Refill:  1       Patient Instructions   1. Comprehensive diabetic foot examination, type 2 DM, encounter for [facility])  DM foot exam done today, normal, documented in chart  -  DIABETES FOOT EXAM    2. Type 2 diabetes mellitus without complication, without long-term current use of insulin (HCC)  A1c was excellent at last check however there has been weight gain since last visit, labs perorders, increase exercise, follow a diabetic friendly diet, compliance with medication as prescribed  - HEMOGLOBIN A1C WITH EAG; Future  - CBC W/O DIFF; Future  - METABOLIC PANEL, COMPREHENSIVE; Future  - LIPID PANEL; Future    3. Encounter for immunization  DM--qualifies for Pneumovax between ates 19=-64, admin today in office  - PNEUMOCOCCAL POLYSACCHARIDE VACCINE, 23-VALENT, ADULT OR IMMUNOSUPPRESSED PT DOSE,  - ADMIN PNEUMOCOCCAL VACCINE    4. Allergic rhinitis due to pollen, unspecified seasonality  Recommend ok to add flonase in addition to zyrtec/singulair    5. Controlled type 2 diabetes mellitus without complication, without long-term current use of insulin (HCC)  As above  - HEMOGLOBIN A1C WITH EAG; Future  - CBC W/O DIFF; Future  - METABOLIC PANEL, COMPREHENSIVE; Future    6. Essential hypertension  bp well controlled, c/w med as prescribed  - CBC W/O DIFF; Future  - METABOLIC PANEL, COMPREHENSIVE; Future    7. Hypercholesterolemia  C/w meds as prescribed, due for labs as ordered  - METABOLIC PANEL, COMPREHENSIVE; Future  - LIPID PANEL; Future    8. Family history of premature CAD  As above2    9. Obesity (BMI 30-39. 9)  Counseling as above    10.  ROB (obstructive sleep apnea)  Continue with CPAP, referral to sleep med for titration  - SLEEP MEDICINE REFERRAL    6m follow up or sooner if needed        [address] [facility]  Chief Complaint   Patient presents with    Hypertension     Follow up       [id number] Freedom Perkins is a 44 y.o. male presenting today for follow up  HTN: patient reports stable on medications. No chest pain, sob, headache, blurred vision, leg swelling, diaphoresis, falls. Compliant with medications as prescribed. not checking blood pressures at home. No significant hyper or hypotensive episodes that the patient reports today. DM: taking metformin, not checking sugars at home, no polyuria, polydipsia, polyphagia  Weight: up 8 lbs from last visit, not exercising, knows he ought to lose some weight, he feels like he's achey more than he was, he feels his strength is different and he is losing his endurance  Car: sleeping ok, using cpap  HLD: taking fenofibrate and zocor  Allergies: taking meds, doing ok    ROS  Review of Systems  A 12 point review of systems was negative except as noted here or in the HPI. OBJECTIVE  Visit Vitals  /66 (BP 1 Location: Left arm, BP Patient Position: Sitting)   Pulse 78   Temp 98.4 °F (36.9 °C) (Oral)   Resp 18   Ht 5' 10\" (1.778 m)   Wt 262 lb 3.2 oz (118.9 kg)   SpO2 100%   BMI 37.62 kg/m²       Physical Exam  Vitals signs and nursing note reviewed. Constitutional:       General: He is not in acute distress. Appearance: Normal appearance. He is well-developed. He is obese. He is not toxic-appearing or diaphoretic. HENT:      Head: Normocephalic and atraumatic. Right Ear: External ear normal.      Left Ear: External ear normal.      Nose: Rhinorrhea present. Mouth/Throat:      Mouth: Mucous membranes are moist.      Pharynx: Oropharynx is clear. Posterior oropharyngeal erythema present. No oropharyngeal exudate. Eyes:      General: No scleral icterus. Right eye: No discharge. Left eye: No discharge. Extraocular Movements: Extraocular movements intact. Conjunctiva/sclera: Conjunctivae normal.      Pupils: Pupils are equal, round, and reactive to light. Neck:      Musculoskeletal: Normal range of motion and neck supple. Vascular: No carotid bruit. Cardiovascular:      Rate and Rhythm: Normal rate and regular rhythm. Heart sounds: Normal heart sounds. No murmur. No friction rub. No gallop. Pulmonary:      Effort: Pulmonary effort is normal. No respiratory distress. Breath sounds: Normal breath sounds. No stridor. No wheezing, rhonchi or rales. Abdominal:      General: Bowel sounds are normal. There is no distension. Palpations: Abdomen is soft. Tenderness: There is no abdominal tenderness. Musculoskeletal:         General: No swelling, tenderness or signs of injury. Right lower leg: No edema. Left lower leg: No edema. Lymphadenopathy:      Cervical: No cervical adenopathy. Skin:     General: Skin is warm and dry. Capillary Refill: Capillary refill takes less than 2 seconds. Findings: No bruising or rash. Neurological:      General: No focal deficit present. Mental Status: He is alert and oriented to person, place, and time. Mental status is at baseline. Gait: Gait normal.      Comments: DIABETIC FOOT EXAM    Left Foot:   Visual Exam: normal    Pulse DP: 2+ (normal)   Filament test: normal sensation    Vibratory sensation: normal      Right Foot:   Visual Exam: normal    Pulse DP: 2+ (normal)   Filament test: normal sensation    Vibratory sensation: normal   Psychiatric:         Mood and Affect: Mood normal.         Behavior: Behavior normal.         Thought Content: Thought content normal.         Judgment: Judgment normal.         No results found for any visits on 03/16/20.     HISTORICAL  Reviewed and updated today, and as noted below:    Past Medical History:   Diagnosis Date    Allergic rhinitis     Anxiety     Essential hypertension 8/1/2014    Family history of premature CAD 8/1/2014  HTN (hypertension) 8/1/2014    Hyperlipidemia     Hypertension     Obesity 9/23/2014    ROB (obstructive sleep apnea) 3/18/2016    Prediabetes 12/18/2015     Past Surgical History:   Procedure Laterality Date    HX HEENT      wisdom teeth removal     Family History   Problem Relation Age of Onset    Cancer Mother         lung    Diabetes Father     Heart Disease Father 62        CAD    Hypertension Father      Social History     Tobacco Use   Smoking Status Never Smoker   Smokeless Tobacco Never Used     Social History     Socioeconomic History    Marital status:      Spouse name: Not on file    Number of children: Not on file    Years of education: Not on file    Highest education level: Not on file   Tobacco Use    Smoking status: Never Smoker    Smokeless tobacco: Never Used   Substance and Sexual Activity    Alcohol use: No    Drug use: No    Sexual activity: Yes     Partners: Female     No Known Allergies    LAB REVIEW  Lab Results   Component Value Date/Time    Sodium 140 09/12/2019 10:49 AM    Potassium 4.3 09/12/2019 10:49 AM    Chloride 101 09/12/2019 10:49 AM    CO2 25 09/12/2019 10:49 AM    Glucose 130 (H) 09/12/2019 10:49 AM    BUN 19 09/12/2019 10:49 AM    Creatinine 0.84 09/12/2019 10:49 AM    BUN/Creatinine ratio 23 (H) 09/12/2019 10:49 AM    GFR est  09/12/2019 10:49 AM    GFR est non- 09/12/2019 10:49 AM    Calcium 9.7 09/12/2019 10:49 AM    Bilirubin, total 0.3 09/12/2019 10:49 AM    AST (SGOT) 21 09/12/2019 10:49 AM    Alk.  phosphatase 44 09/12/2019 10:49 AM    Protein, total 6.8 09/12/2019 10:49 AM    Albumin 4.4 09/12/2019 10:49 AM    A-G Ratio 1.8 09/12/2019 10:49 AM    ALT (SGPT) 23 09/12/2019 10:49 AM     Lab Results   Component Value Date/Time    WBC 5.7 09/12/2019 10:49 AM    HGB 13.1 09/12/2019 10:49 AM    HCT 40.6 09/12/2019 10:49 AM    PLATELET 038 16/20/5715 10:49 AM    MCV 87 09/12/2019 10:49 AM     Lab Results   Component Value Date/Time Hemoglobin A1c 6.4 (H) 09/12/2019 10:49 AM     Lab Results   Component Value Date/Time    Cholesterol, total 168 02/13/2019 08:12 AM    HDL Cholesterol 46 02/13/2019 08:12 AM    LDL, calculated 95 02/13/2019 08:12 AM    VLDL, calculated 27 02/13/2019 08:12 AM    Triglyceride 136 02/13/2019 08:12 AM           [facility]  03/16/20 8:27 AM    Portions of this note may have been populated using smart dictation software and may have \"sounds-like\" errors present. Pt was counseled on risks, benefits and alternatives of treatment options. All questions were asked and answered and the patient was agreeable with the treatment plan as outlined.

## 2020-03-16 NOTE — PATIENT INSTRUCTIONS
1. Comprehensive diabetic foot examination, type 2 DM, encounter for [facility]) DM foot exam done today, normal, documented in chart 
-  DIABETES FOOT EXAM 
 
2. Type 2 diabetes mellitus without complication, without long-term current use of insulin ([facility] [address] A1c was excellent at last check however there has been weight gain since last visit, labs perorders, increase exercise, follow a diabetic friendly diet, compliance with medication as prescribed 
- HEMOGLOBIN A1C WITH EAG; Future - CBC W/O DIFF; Future - METABOLIC PANEL, COMPREHENSIVE; Future - LIPID PANEL; Future 3. Encounter for immunization DM--qualifies for Pneumovax between ates 19=-64, admin today in office 
- PNEUMOCOCCAL POLYSACCHARIDE VACCINE, 23-VALENT, ADULT OR IMMUNOSUPPRESSED PT DOSE, 
- ADMIN PNEUMOCOCCAL VACCINE 
 
4. Allergic rhinitis due to pollen, unspecified seasonality Recommend ok to add flonase in addition to zyrtec/singulair 5. Controlled type 2 diabetes mellitus without complication, without long-term current use of insulin ([facility] [address] As above 
- HEMOGLOBIN A1C WITH EAG; Future - CBC W/O DIFF; Future - METABOLIC PANEL, COMPREHENSIVE; Future 6. Essential hypertension 
bp well controlled, c/w med as prescribed 
- CBC W/O DIFF; Future - METABOLIC PANEL, COMPREHENSIVE; Future 7. Hypercholesterolemia C/w meds as prescribed, due for labs as ordered - METABOLIC PANEL, COMPREHENSIVE; Future - LIPID PANEL; Future 8. Family history of premature CAD As [place] 9. Obesity (BMI 30-39. 9) Counseling as above 10. ROB (obstructive sleep apnea) Continue with CPAP, referral to sleep med for titration 
- SLEEP MEDICINE REFERRAL 6m follow up or sooner if needed

## 2020-03-16 NOTE — PROGRESS NOTES
Chief Complaint   Patient presents with    Hypertension     Follow up     1. Have you been to the ER, urgent care clinic since your last visit? Hospitalized since your last visit? No      2. Have you seen or consulted any other health care providers outside of the [address] [place] [address] since your last visit? Include any pap smears or colon screening.  No

## 2020-06-08 ENCOUNTER — VIRTUAL VISIT (OUTPATIENT)
Dept: SLEEP MEDICINE | Age: 50
End: 2020-06-08

## 2020-06-08 DIAGNOSIS — G47.33 OSA (OBSTRUCTIVE SLEEP APNEA): Primary | ICD-10-CM

## 2020-06-08 DIAGNOSIS — E66.9 OBESITY (BMI 30-39.9): ICD-10-CM

## 2020-06-08 DIAGNOSIS — I10 ESSENTIAL HYPERTENSION: ICD-10-CM

## 2020-06-08 NOTE — PROGRESS NOTES
[address] [facility] [place]. [place], [address]  Tel.  [phone]  Fax. 100 [facility] [address]  [place], [address] [facility]  Tel.  [phone]  Fax. [phone] [address]  Tel.  [phone]  Fax. [phone]     Norman Hale is a 44 y.o. male who was seen by synchronous (real-time) audio-video technology on 6/8/2020. Consent:  He and/or his healthcare decision maker is aware that this patient-initiated Telehealth encounter is a billable service, with coverage as determined by his insurance carrier. He is aware that he may receive a bill and has provided verbal consent to proceed: Yes    I was in the office while conducting this encounter. Chief Complaint       No chief complaint on file. HPI      Norman Hale is 44 y.o. male seen for evaluation of a sleep disorder. He notes having had an initial evaluation approximately 17 years ago at [facility] at which time he was diagnosed with obstructive sleep apnea. Details of this initial evaluation are not available. He was started on CPAP; potentially has had only 2 units during this time. He is not had compliance follow-up. Since his initial assessment he has gained 40 pounds. He has not been receiving supplies. He normally retires 10 PM and awakens at 6 AM.  He will awaken 4-6 times during the night. He notes awakening with a gasp or snort, snoring and apparent apnea. He denies vivid dreaming or nightmares, sleep talking or sleepwalking, bruxism or nocturnal incontinence, abnormal arm or leg movements, hypnagogic hallucinations, sleep paralysis or cataplexy.         Hankamer Sleepiness Score: 6       No Known Allergies    Current Outpatient Medications   Medication Sig Dispense Refill    sertraline (ZOLOFT) 100 mg tablet TAKE 1 TABLET BY MOUTH  DAILY 90 Tab 1    metFORMIN ER (GLUCOPHAGE XR) 750 mg tablet TAKE 1 TABLET BY MOUTH DAILY WITH DINNER 90 Tab 1    simvastatin (ZOCOR) 40 mg tablet TAKE 1 TABLET BY MOUTH  NIGHTLY 90 Tab 3    lisinopril (PRINIVIL, ZESTRIL) 5 mg tablet TAKE 1 TABLET BY MOUTH  DAILY 90 Tab 1    montelukast (SINGULAIR) 10 mg tablet TAKE 1 TABLET BY MOUTH  DAILY 90 Tab 3    fenofibrate (LOFIBRA) 160 mg tablet TAKE 1 TABLET BY MOUTH  DAILY BEFORE BREAKFAST 90 Tab 3    chlorpheniramine-dm (CORICIDIN HBP COUGH AND COLD) 4-30 mg tab Take  by mouth.  naproxen sodium (ALEVE) 220 mg tablet Take 1 Tab by mouth two (2) times daily as needed.  esomeprazole (NEXIUM) 20 mg capsule Take  by mouth daily.  multivitamin (ONE A DAY) tablet Take 1 Tab by mouth daily.  glucosamine-chondroitin (ARTHX) 500-400 mg cap Take 1 Cap by mouth daily.  coenzyme q10 (CO Q-10) 10 mg cap Take  by mouth. Patient's takes 300 mg daily. He  has a past medical history of Allergic rhinitis, Anxiety, Essential hypertension (8/1/2014), Family history of premature CAD (8/1/2014), HTN (hypertension) (8/1/2014), Hyperlipidemia, Hypertension, Obesity (9/23/2014), ROB (obstructive sleep apnea) (3/18/2016), and Prediabetes (12/18/2015). He  has a past surgical history that includes hx heent. He family history includes Cancer in his mother; Diabetes in his father; Heart Disease (age of onset: 62) in his father; Hypertension in his father. He  reports that he has never smoked. He has never used smokeless tobacco. He reports that he does not drink alcohol or use drugs. Review of Systems:  Review of Systems   Constitutional: Negative for chills and fever. HENT: Negative for hearing loss and tinnitus. Eyes: Negative for blurred vision and double vision. Respiratory: Negative for cough and shortness of breath. Cardiovascular: Negative for chest pain and palpitations. Gastrointestinal: Positive for heartburn. Genitourinary: Negative for urgency. Musculoskeletal: Positive for back pain and neck pain. Skin: Negative for itching and rash. Neurological: Positive for dizziness and headaches. Psychiatric/Behavioral: The patient is nervous/anxious. Objective: There were no vitals taken for this visit. There is no height or weight on file to calculate BMI. Due to this being a telemedicine evaluation, certain elements of the physical examination are unable to be assessed. General:   Conversant, cooperative   Eyes:   no nystagmus   Oropharynx:   Mallampati score II, tongue normal                   Skin:  no obvious rashes   Neuro:  Speech fluent, face symmetrical, tongue movement normal   Psych:  Normal affect,  normal countenance        Assessment:       ICD-10-CM ICD-9-CM    1. ROB (obstructive sleep apnea) G47.33 327.23 SLEEP STUDY UNATTENDED, 4 CHANNEL   2. Obesity (BMI 30-39. 9) E66.9 278.00    3. Essential hypertension I10 401.9      History of sleep disordered breathing without regular follow-up. He will be reevaluated with a home sleep test.  Results will be reviewed with him. Plan:     Orders Placed This Encounter    SLEEP STUDY UNATTENDED, 4 CHANNEL     Order Specific Question:   Reason for Exam     Answer:   history of sleep apnea; reevaluation       * Patient has a history and examination consistent with the diagnosis of sleep apnea. *Home sleep testing was ordered for initial evaluation. * He was provided information on sleep apnea including corresponding risk factors and the importance of proper treatment. * Treatment options if indicated were reviewed today. Instructions:    o The patient would benefit from weight reduction measures. o Do not engage in activities requiring a normal degree of alertness if fatigue is present.   o The patient understands that untreated or undertreated sleep apnea could impair judgement and the ability to function normally during the day.  o Call or return if symptoms worsen or persist.          Juan Miguel Burch MD, [facility]  Electronically signed 06/08/20     Pursuant to the emergency declaration under the [facility] waiver authority and the [employer] Act, this Virtual  Visit was conducted, with patient's consent, to reduce the patient's risk of exposure to COVID-19 and provide continuity of care for an established patient. Services were provided through a video synchronous discussion virtually to substitute for in-person clinic visit. Tuyet Carrero MD     This note was created using voice recognition software. Despite editing, there may be syntax errors. This note will not be viewable in [address]

## 2020-07-24 DIAGNOSIS — E78.00 HYPERCHOLESTEROLEMIA: ICD-10-CM

## 2020-07-27 RX ORDER — FENOFIBRATE 160 MG/1
TABLET ORAL
Qty: 90 TAB | Refills: 0 | Status: SHIPPED | OUTPATIENT
Start: 2020-07-27 | End: 2020-10-19

## 2020-07-29 ENCOUNTER — VIRTUAL VISIT (OUTPATIENT)
Dept: FAMILY MEDICINE CLINIC | Age: 50
End: 2020-07-29

## 2020-07-29 DIAGNOSIS — I10 ESSENTIAL HYPERTENSION: ICD-10-CM

## 2020-07-29 DIAGNOSIS — G47.33 OSA (OBSTRUCTIVE SLEEP APNEA): ICD-10-CM

## 2020-07-29 DIAGNOSIS — E66.9 OBESITY (BMI 30-39.9): ICD-10-CM

## 2020-07-29 DIAGNOSIS — E11.9 CONTROLLED TYPE 2 DIABETES MELLITUS WITHOUT COMPLICATION, WITHOUT LONG-TERM CURRENT USE OF INSULIN (HCC): Primary | ICD-10-CM

## 2020-07-29 DIAGNOSIS — E78.00 HYPERCHOLESTEROLEMIA: ICD-10-CM

## 2020-07-29 RX ORDER — FISH OIL/DHA/EPA 1200-144MG
CAPSULE ORAL
COMMUNITY
End: 2021-08-13

## 2020-07-29 NOTE — PROGRESS NOTES
Chief Complaint   Patient presents with    Follow-up   1. Have you been to the ER, urgent care clinic since your last visit? Hospitalized since your last visit? No    2. Have you seen or consulted any other health care providers outside of the [address] [place] [address] since your last visit? Include any pap smears or colon screening.  No

## 2020-07-29 NOTE — PROGRESS NOTES
Lita Pickard is a 44 y.o. male who was seen by synchronous (real-time) audio-video technology on 7/29/2020. Consent: Lita Pickard, who was seen by synchronous (real-time) audio-video technology, and/or his healthcare decision maker, is aware that this patient-initiated, Telehealth encounter on 7/29/2020 is a billable service, with coverage as determined by his insurance carrier. He is aware that he may receive a bill and has provided verbal consent to proceed: Yes. Assessment & Plan:   1. Controlled type 2 diabetes mellitus without complication, without long-term current use of insulin ([facility] [address]  Needs lab recheck, if a1c elevated, have time to optimize b/f surgery, go for labs, c/w meds, confirmed metformin he is on was not recalled    2. Hypercholesterolemia  C/w current meds, fasting lipid panel    3. Obesity (BMI 30-39. 9)  Pt desires weight loss surgery, he is planning to use [place]. 4. ROB (obstructive sleep apnea)  C/w current management, weight loss is a goal for improving this     5. Essential hypertension  Unknown bp, c/w meds, labs per orders previously placed    Please go for labs placed in march, fasting  I will send [employer] message with results2          Pt was counseled on risks, benefits and alternatives of treatment options. All questions were asked and answered and the patient was agreeable with the treatment plan as outlined. Encounter time today was 25 minutes and more than 50% of this encounter was spent in counseling face-to-face regarding Diagnosis, Patient Education, Medication Management, Compliance and Impressions. Time documented includes face to face time, documentation and chart review if applicable except as noted.   Subjective:   Lita Pickard is a 44 y.o. male who was seen for Follow-up      The patient reports to me he is overdue for labs  He reports to me that his daughter and he are looking at having gastric sleeve surgery next month  They're looking at going to a practice in [address] [place] [address] or [place], [place]. He reports he has to do a lot of \"weight loss prep\"    Doing a low carb diet right now  Easing towards a liquid diet before surgery  He is on day 3 and he's feeling ok  Was thinking maybe of just waiting to get labs the week before he went to [facility] and [address] so it would be close to when he had to be there but understands why having them now would be helpful    Not logging food  Not exercising    Wt Readings from Last 3 Encounters:   03/16/20 262 lb 3.2 oz (118.9 kg)   09/18/19 254 lb (115.2 kg)   02/22/19 259 lb (117.5 kg)     Lab Results   Component Value Date/Time    Hemoglobin A1c 6.4 (H) 09/12/2019 10:49 AM    Hemoglobin A1c 6.9 (H) 02/13/2019 08:12 AM    Hemoglobin A1c 6.5 (H) 09/18/2018 09:04 AM     Compliant with dm meds  Compliant with bp meds (not checking bp)  Compliant with HLD meds (no recent lipids for >1 y)  Medications, allergies, PMH, PSH, SOCH, [address] reviewed and updated per routine protocol, see chart for review and changes if not noted here. ROS  A 12 point review of systems was negative except as noted here or in the HPI. Objective:   Vital Signs: (As obtained by patient/caregiver at home)  There were no vitals taken for this visit.      [INSTRUCTIONS:  \"[x]\" Indicates a positive item  \"[]\" Indicates a negative item  -- DELETE ALL ITEMS NOT EXAMINED]    Constitutional: [x] Appears well-developed and well-nourished [x] No apparent distress      [] Abnormal -     Mental status: [x] Alert and awake  [x] Oriented to person/place/time [x] Able to follow commands    [] Abnormal -     Eyes:   EOM    [x]  Normal    [] Abnormal -   Sclera  [x]  Normal    [] Abnormal -          Discharge [x]  None visible   [] Abnormal -     HENT: [x] Normocephalic, atraumatic  [] Abnormal -   [x] Mouth/Throat: Mucous membranes are moist    External Ears [x] Normal  [] Abnormal -    Neck: [x] No visualized mass [] Abnormal - Pulmonary/Chest: [x] Respiratory effort normal   [x] No visualized signs of difficulty breathing or respiratory distress        [] Abnormal -      Musculoskeletal:   [x] Normal gait with no signs of ataxia         [x] Normal range of motion of neck        [] Abnormal -     Neurological:        [x] No Facial Asymmetry (Cranial nerve 7 motor function) (limited exam due to video visit)          [x] No gaze palsy        [] Abnormal -          Skin:        [x] No significant exanthematous lesions or discoloration noted on facial skin         [] Abnormal -            Psychiatric:       [x] Normal Affect [] Abnormal -        [x] No Hallucinations    Other pertinent observable physical exam findings:none apparent    We discussed the expected course, resolution and complications of the diagnosis(es) in detail. Medication risks, benefits, costs, interactions, and alternatives were discussed as indicated. I advised him to contact the office if his condition worsens, changes or fails to improve as anticipated. He expressed understanding with the diagnosis(es) and plan. Wilda Mason is a 44 y.o. male who was evaluated by a video visit encounter for concerns as above. Patient identification was verified prior to start of the visit. A caregiver was present when appropriate. Due to this being a TeleHealth encounter (During JKD-35 public health emergency), evaluation of the following organ systems was limited: Vitals/Constitutional/EENT/Resp/CV/GI//MS/Neuro/Skin/Heme-Lymph-Imm. Pursuant to the emergency declaration under the [facility], [facility] waiver authority and the [employer] and [employer] Act, this Virtual  Visit was conducted, with patient's (and/or legal guardian's) consent, to reduce the patient's risk of exposure to COVID-19 and provide necessary medical care.      Services were provided through a video synchronous discussion virtually to substitute for in-person clinic visit. Patient and provider were located at their individual homes. Ivania Heard MD  [facility]  07/29/20 8:50 AM     Portions of this note may have been populated using smart dictation software and may have \"sounds-like\" errors present.

## 2020-07-30 ENCOUNTER — HOSPITAL ENCOUNTER (OUTPATIENT)
Dept: LAB | Age: 50
Discharge: HOME | End: 2020-07-30

## 2020-07-30 DIAGNOSIS — I10 ESSENTIAL HYPERTENSION: ICD-10-CM

## 2020-07-30 DIAGNOSIS — E11.9 TYPE 2 DIABETES MELLITUS WITHOUT COMPLICATION, WITHOUT LONG-TERM CURRENT USE OF INSULIN (HCC): ICD-10-CM

## 2020-07-30 DIAGNOSIS — E78.00 HYPERCHOLESTEROLEMIA: ICD-10-CM

## 2020-07-30 DIAGNOSIS — E11.9 CONTROLLED TYPE 2 DIABETES MELLITUS WITHOUT COMPLICATION, WITHOUT LONG-TERM CURRENT USE OF INSULIN (HCC): ICD-10-CM

## 2020-07-30 LAB
ALBUMIN SERPL-MCNC: 4.3 G/DL (ref 3.5–5)
ALBUMIN/GLOB SERPL: 1.3 {RATIO} (ref 1.1–2.2)
ALP SERPL-CCNC: 49 U/L (ref 45–117)
ALT SERPL-CCNC: 38 U/L (ref 12–78)
ANION GAP SERPL CALC-SCNC: 9 MMOL/L (ref 5–15)
AST SERPL-CCNC: 18 U/L (ref 15–37)
BILIRUB SERPL-MCNC: 0.5 MG/DL (ref 0.2–1)
BUN SERPL-MCNC: 19 MG/DL (ref 6–20)
BUN/CREAT SERPL: 21 (ref 12–20)
CALCIUM SERPL-MCNC: 9.5 MG/DL (ref 8.5–10.1)
CHLORIDE SERPL-SCNC: 104 MMOL/L (ref 97–108)
CHOLEST SERPL-MCNC: 156 MG/DL
CO2 SERPL-SCNC: 25 MMOL/L (ref 21–32)
CREAT SERPL-MCNC: 0.9 MG/DL (ref 0.7–1.3)
ERYTHROCYTE [DISTWIDTH] IN BLOOD BY AUTOMATED COUNT: 12.6 % (ref 11.5–14.5)
EST. AVERAGE GLUCOSE BLD GHB EST-MCNC: 140 MG/DL
GLOBULIN SER CALC-MCNC: 3.4 G/DL (ref 2–4)
GLUCOSE SERPL-MCNC: 134 MG/DL (ref 65–100)
HBA1C MFR BLD: 6.5 % (ref 4–5.6)
HCT VFR BLD AUTO: 41.4 % (ref 36.6–50.3)
HDLC SERPL-MCNC: 47 MG/DL
HDLC SERPL: 3.3 {RATIO} (ref 0–5)
HGB BLD-MCNC: 13.5 G/DL (ref 12.1–17)
LDLC SERPL CALC-MCNC: 75.2 MG/DL (ref 0–100)
LIPID PROFILE,FLP: ABNORMAL
MCH RBC QN AUTO: 28.5 PG (ref 26–34)
MCHC RBC AUTO-ENTMCNC: 32.6 G/DL (ref 30–36.5)
MCV RBC AUTO: 87.5 FL (ref 80–99)
NRBC # BLD: 0 K/UL (ref 0–0.01)
NRBC BLD-RTO: 0 PER 100 WBC
PLATELET # BLD AUTO: 220 K/UL (ref 150–400)
PMV BLD AUTO: 10.8 FL (ref 8.9–12.9)
POTASSIUM SERPL-SCNC: 4.1 MMOL/L (ref 3.5–5.1)
PROT SERPL-MCNC: 7.7 G/DL (ref 6.4–8.2)
RBC # BLD AUTO: 4.73 M/UL (ref 4.1–5.7)
SODIUM SERPL-SCNC: 138 MMOL/L (ref 136–145)
TRIGL SERPL-MCNC: 169 MG/DL (ref ?–150)
VLDLC SERPL CALC-MCNC: 33.8 MG/DL
WBC # BLD AUTO: 5.7 K/UL (ref 4.1–11.1)

## 2020-07-30 NOTE — PROGRESS NOTES
a1c just about stable but it is up 0.1 from prior check. Normal blood count  LDL, Total cholesterol improved from last year, HDL stable, mild increase in Triglycerides to borderline range (this is most susceptible to dietary changes)  Metabolic panel otherwise fine.

## 2020-07-31 DIAGNOSIS — E11.9 CONTROLLED TYPE 2 DIABETES MELLITUS WITHOUT COMPLICATION, WITHOUT LONG-TERM CURRENT USE OF INSULIN (HCC): ICD-10-CM

## 2020-08-03 RX ORDER — SERTRALINE HYDROCHLORIDE 100 MG/1
TABLET, FILM COATED ORAL
Qty: 90 TAB | Refills: 3 | Status: SHIPPED | OUTPATIENT
Start: 2020-08-03 | End: 2021-06-22

## 2020-08-03 RX ORDER — LISINOPRIL 5 MG/1
TABLET ORAL
Qty: 90 TAB | Refills: 3 | Status: SHIPPED | OUTPATIENT
Start: 2020-08-03 | End: 2021-06-22

## 2020-08-03 RX ORDER — METFORMIN HYDROCHLORIDE 750 MG/1
TABLET, EXTENDED RELEASE ORAL
Qty: 90 TAB | Refills: 3 | Status: SHIPPED | OUTPATIENT
Start: 2020-08-03 | End: 2021-07-14 | Stop reason: ALTCHOICE

## 2020-08-14 ENCOUNTER — TELEPHONE (OUTPATIENT)
Dept: FAMILY MEDICINE CLINIC | Age: 50
End: 2020-08-14

## 2020-08-14 NOTE — TELEPHONE ENCOUNTER
Pt needs screening for Covid antibodies via labs and needs nasal swab prior to weight loss surgery scheduled for 8/24/20. Pt scheduled for nurse visit for nasal swab at our office on Monday at 9 am and is requesting call back to advise if Dr. Paulina Oviedo can order the antibody labs to be done at the [address] [place] [address] on the same day. Please advise. Pt called back to advise Dr. Paulina Oviedo he was able to download and send attachment in his Mychart for her to view.   Anthony

## 2020-08-14 NOTE — TELEPHONE ENCOUNTER
In general pre op testing should be ordered by the doctor who is doing surgery and done at the facility where surgery will be done.  I need a note from the doctor regarding this before we can proceed

## 2020-08-17 ENCOUNTER — HOSPITAL ENCOUNTER (OUTPATIENT)
Dept: LAB | Age: 50
Discharge: HOME | End: 2020-08-17
Payer: MEDICARE

## 2020-08-17 ENCOUNTER — CLINICAL SUPPORT (OUTPATIENT)
Dept: FAMILY MEDICINE CLINIC | Age: 50
End: 2020-08-17

## 2020-08-17 DIAGNOSIS — U07.1 COVID-19: Primary | ICD-10-CM

## 2020-08-17 DIAGNOSIS — Z01.818 PREOPERATIVE TESTING: ICD-10-CM

## 2020-08-17 DIAGNOSIS — Z11.59 SPECIAL SCREENING EXAMINATION FOR VIRAL DISEASE: ICD-10-CM

## 2020-08-17 PROCEDURE — 86769 SARS-COV-2 COVID-19 ANTIBODY: CPT

## 2020-08-17 NOTE — TELEPHONE ENCOUNTER
Pt has been seen in office, nurse visit, and COVID swab complete and pt will go to lab to have lab work completed.

## 2020-08-17 NOTE — TELEPHONE ENCOUNTER
Pt is scheduled for a 0900 nurse visit appointment today. Are orders able to be placed. Form scanned to e-mail.

## 2020-08-17 NOTE — PROGRESS NOTES
Chief Complaint   Patient presents with    Concern For RZWVP-09 (Coronavirus)     covid testing      Pt presents for COVID testing. Test preformed with out difficulty and pt to quarantine until he hears from office.

## 2020-08-19 ENCOUNTER — TELEPHONE (OUTPATIENT)
Dept: FAMILY MEDICINE CLINIC | Age: 50
End: 2020-08-19

## 2020-08-19 LAB — SARS-COV-2 ABS, NUCLEOCAPSID: NEGATIVE

## 2020-08-19 NOTE — TELEPHONE ENCOUNTER
Pt's wife called stating he received notification on Covid results in his Mychart but is not sure if nasal swab results are in and his surgeon's office is requesting status. Lab results in but nasal swab results may be still pending. Pt will call back tomorrow for results.  Anthony

## 2020-08-20 ENCOUNTER — TELEPHONE (OUTPATIENT)
Dept: FAMILY MEDICINE CLINIC | Age: 50
End: 2020-08-20

## 2020-08-20 LAB — SARS-COV-2, COV2NT: NOT DETECTED

## 2020-09-11 ENCOUNTER — TELEPHONE (OUTPATIENT)
Dept: FAMILY MEDICINE CLINIC | Age: 50
End: 2020-09-11

## 2020-09-11 NOTE — TELEPHONE ENCOUNTER
Pt has an appointment on 9/21/20 and is asking if Dr Darinel Cummings wanted him to get labs prior?   Please [phone]

## 2020-09-21 ENCOUNTER — VIRTUAL VISIT (OUTPATIENT)
Dept: FAMILY MEDICINE CLINIC | Age: 50
End: 2020-09-21
Payer: COMMERCIAL

## 2020-09-21 DIAGNOSIS — E78.00 HYPERCHOLESTEROLEMIA: ICD-10-CM

## 2020-09-21 DIAGNOSIS — K91.2 POSTOPERATIVE MALABSORPTION: ICD-10-CM

## 2020-09-21 DIAGNOSIS — I10 ESSENTIAL HYPERTENSION: ICD-10-CM

## 2020-09-21 DIAGNOSIS — E11.9 CONTROLLED TYPE 2 DIABETES MELLITUS WITHOUT COMPLICATION, WITHOUT LONG-TERM CURRENT USE OF INSULIN (HCC): Primary | ICD-10-CM

## 2020-09-21 DIAGNOSIS — Z98.890 S/P GASTRIC SURGERY: ICD-10-CM

## 2020-09-21 PROCEDURE — 99214 OFFICE O/P EST MOD 30 MIN: CPT | Performed by: FAMILY MEDICINE

## 2020-09-21 RX ORDER — OMEPRAZOLE 20 MG/1
20 CAPSULE, DELAYED RELEASE ORAL DAILY
COMMUNITY
End: 2021-07-14

## 2020-09-21 RX ORDER — CYANOCOBALAMIN 1000 UG/ML
1000 INJECTION, SOLUTION INTRAMUSCULAR; SUBCUTANEOUS ONCE
COMMUNITY
End: 2021-08-13

## 2020-09-21 NOTE — PROGRESS NOTES
Chief Complaint   Patient presents with    Follow-up   1. Have you been to the ER, urgent care clinic since your last visit? Hospitalized since your last visit? No    2. Have you seen or consulted any other health care providers outside of the [address] [place] [address] since your last visit? Include any pap smears or colon screening.  No

## 2020-09-21 NOTE — PROGRESS NOTES
Pennie Ayaal is a 44 y.o. male who was seen by synchronous (real-time) audio-video technology on 9/21/2020. Consent: Pennie Ayala, who was seen by synchronous (real-time) audio-video technology, and/or his healthcare decision maker, is aware that this patient-initiated, Telehealth encounter on 9/21/2020 is a billable service, with coverage as determined by his insurance carrier. He is aware that he may receive a bill and has provided verbal consent to proceed: Yes. CONVERTED TO TELEPHONE CALL  Assessment & Plan:   1. Controlled type 2 diabetes mellitus without complication, without long-term current use of insulin (HCC)  Anticipated pulling off metformin after next labs given rapid weight loss and normalizing a1c  - HEMOGLOBIN A1C WITH EAG; Future  - CBC W/O DIFF; Future  - METABOLIC PANEL, COMPREHENSIVE; Future  - MICROALBUMIN, UR, RAND W/ MICROALB/CREAT RATIO; Future  - LIPID PANEL; Future    2. Hypercholesterolemia  Will watch with next labs, may be able to bring down 1/2 chol medications  - METABOLIC PANEL, COMPREHENSIVE; Future  - LIPID PANEL; Future    3. S/P gastric surgery  Labs per orders, c/w nutrition and fitness  - HEMOGLOBIN A1C WITH EAG; Future  - CBC W/O DIFF; Future  - METABOLIC PANEL, COMPREHENSIVE; Future  - MICROALBUMIN, UR, RAND W/ MICROALB/CREAT RATIO; Future  - LIPID PANEL; Future  - VITAMIN B12; Future  - VITAMIN D, 25 HYDROXY; Future    4. Postoperative malabsorption  As above  - CBC W/O DIFF; Future  - METABOLIC PANEL, COMPREHENSIVE; Future  - VITAMIN B12; Future  - VITAMIN D, 25 HYDROXY; Future    5. Essential hypertension  Consider if normal bp decr/stop lisinopril if a1c normalizes          Pt was counseled on risks, benefits and alternatives of treatment options. All questions were asked and answered and the patient was agreeable with the treatment plan as outlined.     Encounter time today was 25 minutes and more than 50% of this encounter was spent in counselingBY TELEPHONE regarding Diagnosis, Patient Education, Medication Management, Compliance and Impressions. Time documented includes face to face time, documentation and chart review if applicable except as noted. Subjective:   Mayte Torrez is a 44 y.o. male who was seen for Follow-up    He has had bariatric surgery since our last visit, 8/24/20--gastric sleeve, he reports at first it was [place] little rough\" but now he's working on figuring out how much he can consume and he's working on portion size  He has lost about 42 lbs between his pre-surgery diet and his post op period. He reports on surgery day he was about 242 on surgery day and today he is about 220. He is on post op bariatric vitamins now that he's going to continue on his vitamins, he knows the utility    Not checking bg at home   His a1c was 6.5 for me recently and then 1 day prior to surgery was 6.1    BMI now 31.6  Goal weight is 175    Expecting to be able to come off meds if he can in the future. He reports to me his covid and ab was neg-neg    Medications, allergies, PMH, PSH, SOCH, [facility] reviewed and updated per routine protocol, see chart for review and changes if not noted here. ROS  A 12 point review of systems was negative except as noted here or in the HPI. Objective:   Vital Signs: (As obtained by patient/caregiver at home)  Patient-Reported Vitals 9/21/2020   Patient-Reported Weight 220lb   Patient-Reported Height -   Patient-Reported Systolic  (No Data)    had to convert to telephone visit    We discussed the expected course, resolution and complications of the diagnosis(es) in detail. Medication risks, benefits, costs, interactions, and alternatives were discussed as indicated. I advised him to contact the office if his condition worsens, changes or fails to improve as anticipated. He expressed understanding with the diagnosis(es) and plan.        Mayte Torrez is a 44 y.o. male who was evaluated by a video visit encounter for concerns as above. Patient identification was verified prior to start of the visit. A caregiver was present when appropriate. Due to this being a TeleHealth encounter (During GBLPD-19 public health emergency), evaluation of the following organ systems was limited: Vitals/Constitutional/EENT/Resp/CV/GI//MS/Neuro/Skin/Heme-Lymph-Imm. Pursuant to the emergency declaration under the [address] [place] [address], [facility] waiver authority and the [employer] Act, this Virtual  Visit was conducted, with patient's (and/or legal guardian's) consent, to reduce the patient's risk of exposure to COVID-19 and provide necessary medical care. Services were provided through a video synchronous discussion virtually to substitute for in-person clinic visit. Patient and provider were located at their individual homes. Jasmyn Lancaster MD  [facility]  09/21/20 8:08 AM     Portions of this note may have been populated using smart dictation software and may have \"sounds-like\" errors present.

## 2020-10-17 DIAGNOSIS — E78.00 HYPERCHOLESTEROLEMIA: ICD-10-CM

## 2020-10-19 RX ORDER — FENOFIBRATE 160 MG/1
TABLET ORAL
Qty: 90 TAB | Refills: 3 | Status: SHIPPED | OUTPATIENT
Start: 2020-10-19

## 2020-10-22 RX ORDER — MONTELUKAST SODIUM 10 MG/1
TABLET ORAL
Qty: 90 TAB | Refills: 3 | Status: SHIPPED | OUTPATIENT
Start: 2020-10-22 | End: 2022-05-19 | Stop reason: SDUPTHER

## 2021-01-11 ENCOUNTER — HOSPITAL ENCOUNTER (OUTPATIENT)
Dept: SLEEP MEDICINE | Age: 51
Discharge: HOME | End: 2021-01-11
Payer: COMMERCIAL

## 2021-01-11 ENCOUNTER — OFFICE VISIT (OUTPATIENT)
Dept: SLEEP MEDICINE | Age: 51
End: 2021-01-11

## 2021-01-11 DIAGNOSIS — G47.33 OSA (OBSTRUCTIVE SLEEP APNEA): Primary | ICD-10-CM

## 2021-01-11 PROCEDURE — 95806 SLEEP STUDY UNATT&RESP EFFT: CPT | Performed by: SPECIALIST

## 2021-01-11 RX ORDER — SIMVASTATIN 40 MG/1
TABLET, FILM COATED ORAL
Qty: 90 TAB | Refills: 0 | Status: SHIPPED | OUTPATIENT
Start: 2021-01-11 | End: 2021-04-04

## 2021-01-11 NOTE — TELEPHONE ENCOUNTER
Please call patient and let them know I have given them one refill for now, but they need labs drawn prior to more. He should have a lab slip from September.

## 2021-01-11 NOTE — PROGRESS NOTES
[address] [facility] [place]. [place], [address]  Tel.  [phone]  Fax. [address] [facility] 60  [place], [address] [facility]  Tel.  [phone]  Fax. [phone] [address] [facility] Leila Thomason   Tel.  [phone]  Fax. [phone]       S>Paul Bauer is a 44 y.o. male seen today to receive a home sleep testing unit (HST). · Patient was educated on proper hookup and operation of the HST. · Instruction forms and documentation were reviewed and signed. · The patient demonstrated good understanding of the HST.    O>    There were no vitals taken for this visit. A>  No diagnosis found. P>  · General information regarding operations and maintenance of the device was provided. · He was provided information on sleep apnea including coresponding risk factors and the importance of proper treatment. · Follow-up appointment was made to return the HST. He will be contacted once the results have been reviewed. · He was asked to contact our office for any problems regarding his home sleep test study.

## 2021-01-24 ENCOUNTER — TELEPHONE (OUTPATIENT)
Dept: SLEEP MEDICINE | Age: 51
End: 2021-01-24

## 2021-01-24 DIAGNOSIS — G47.33 OSA (OBSTRUCTIVE SLEEP APNEA): Primary | ICD-10-CM

## 2021-01-24 NOTE — TELEPHONE ENCOUNTER
HSAT demonstrated sleep disordered breathing characterized by an overall AHI of 21.3/h associated with minimal SaO2 of 82%. Events were more prominent supine with a supine-related AHI of 46.5/h. Snoring during 1.9% of the study. Recommendation: APAP 6-18 cm    Sleep technologist: Please review the AHI results with the patient. Order has been entered for APAP 6-18 cm. Please schedule first compliance appointment.

## 2021-01-27 ENCOUNTER — DOCUMENTATION ONLY (OUTPATIENT)
Dept: SLEEP MEDICINE | Age: 51
End: 2021-01-27

## 2021-01-27 NOTE — TELEPHONE ENCOUNTER
Pt was notified of results; pt expressed understanding ; DME order faxed; Scheduled 1st adherence appt.

## 2021-02-02 ENCOUNTER — DOCUMENTATION ONLY (OUTPATIENT)
Dept: SLEEP MEDICINE | Age: 51
End: 2021-02-02

## 2021-02-02 NOTE — PROGRESS NOTES
[facility] informed patient's insurance [id number] is not in network. So, order faxed to [facility].  Left message for patient to inform about the change.

## 2021-02-08 ENCOUNTER — TELEPHONE (OUTPATIENT)
Dept: FAMILY MEDICINE CLINIC | Age: 51
End: 2021-02-08

## 2021-02-08 ENCOUNTER — DOCUMENTATION ONLY (OUTPATIENT)
Dept: SLEEP MEDICINE | Age: 51
End: 2021-02-08

## 2021-02-08 NOTE — PROGRESS NOTES
Received call from [facility] (wife) requesting itemized 2020 statement for tax purposes. 3-Patient Identifier confirmed, name,  and address. Since she is not listed on the PHI, office will process request and mail directly to patient. Request completed today.

## 2021-04-04 RX ORDER — SIMVASTATIN 40 MG/1
TABLET, FILM COATED ORAL
Qty: 90 TAB | Refills: 3 | Status: SHIPPED | OUTPATIENT
Start: 2021-04-04 | End: 2022-02-26

## 2021-04-20 ENCOUNTER — DOCUMENTATION ONLY (OUTPATIENT)
Dept: SLEEP MEDICINE | Age: 51
End: 2021-04-20

## 2021-04-20 ENCOUNTER — VIRTUAL VISIT (OUTPATIENT)
Dept: SLEEP MEDICINE | Age: 51
End: 2021-04-20
Payer: COMMERCIAL

## 2021-04-20 DIAGNOSIS — G47.33 OSA (OBSTRUCTIVE SLEEP APNEA): Primary | ICD-10-CM

## 2021-04-20 PROCEDURE — 99213 OFFICE O/P EST LOW 20 MIN: CPT | Performed by: SPECIALIST

## 2021-04-20 NOTE — PROGRESS NOTES
[address] [facility] [address] [facility] [address] [place]. [place], [address]  Tel.  [phone]  Fax. 100 [facility] 60  [place], [address] [facility]  Tel.  [phone]  Fax. [phone] [address] [facility] 3 [place] [address]  Tel.  [phone]  Fax. [phone]         Chief Complaint       Chief Complaint   Patient presents with    Sleep Problem     1st adh         HPI        Nataly George is a 44 y.o. male seen for follow-up. He was evaluated with a sleep study which demonstrated sleep disordered breathing characterized by an overall AHI of 21.3/h associated with minimal SaO2 of 82%. Events were more prominent supine with a supinerelated AHI of 46.5/h. Snoring during 1.9% of the study.     Compliance data downloaded and reviewed in detail with the patient today. During the past 30 days, APAP used during 30 days with the average daily use of 7.9 hours. CMS compliance criteria 100%. AHI 1.6 per hour. He notes he is sleeping well without nocturnal awakening, non-restorative sleep or daytime fatigue. No Known Allergies    Current Outpatient Medications   Medication Sig Dispense Refill    simvastatin (ZOCOR) 40 mg tablet TAKE 1 TABLET BY MOUTH AT  NIGHT 90 Tab 3    montelukast (SINGULAIR) 10 mg tablet TAKE 1 TABLET BY MOUTH  DAILY 90 Tab 3    fenofibrate (LOFIBRA) 160 mg tablet TAKE 1 TABLET BY MOUTH  DAILY BEFORE BREAKFAST 90 Tab 3    cyanocobalamin (VITAMIN B12) 1,000 mcg/mL injection 1,000 mcg by IntraMUSCular route once.  omeprazole (PRILOSEC) 20 mg capsule Take 20 mg by mouth daily.  sertraline (ZOLOFT) 100 mg tablet TAKE 1 TABLET BY MOUTH  DAILY 90 Tab 3    lisinopriL (PRINIVIL, ZESTRIL) 5 mg tablet TAKE 1 TABLET BY MOUTH  DAILY 90 Tab 3    fish oil-dha-epa 1,200-144-216 mg cap Take  by mouth.  chlorpheniramine-dm (CORICIDIN HBP COUGH AND COLD) 4-30 mg tab Take  by mouth.       naproxen sodium (ALEVE) 220 mg tablet Take 1 Tab by mouth two (2) times daily as needed.  esomeprazole (NEXIUM) 20 mg capsule Take  by mouth daily.  multivitamin (ONE A DAY) tablet Take 1 Tab by mouth daily.  glucosamine-chondroitin (ARTHX) 500-400 mg cap Take 1 Cap by mouth daily.  coenzyme q10 (CO Q-10) 10 mg cap Take  by mouth. Patient's takes 300 mg daily.  metFORMIN ER (GLUCOPHAGE XR) 750 mg tablet TAKE 1 TABLET BY MOUTH  DAILY WITH DINNER 90 Tab 3        He  has a past medical history of Allergic rhinitis, Anxiety, Essential hypertension (8/1/2014), Family history of premature CAD (8/1/2014), HTN (hypertension) (8/1/2014), Hyperlipidemia, Hypertension, Obesity (9/23/2014), ROB (obstructive sleep apnea) (3/18/2016), and Prediabetes (12/18/2015). He  has a past surgical history that includes hx heent. He family history includes Cancer in his mother; Diabetes in his father; Heart Disease (age of onset: 62) in his father; Hypertension in his father. He  reports that he has never smoked. He has never used smokeless tobacco. He reports that he does not drink alcohol or use drugs. Review of Systems:  Unchanged per patient    Due to this being a telemedicine evaluation, certain elements of the physical examination are unable to be assessed. Objective:      General:   Conversant, cooperative   Eyes:   no nystagmus                            Neuro:  Speech fluent, face symmetrical             Assessment:       ICD-10-CM ICD-9-CM    1. ROB (obstructive sleep apnea)  G47.33 327.23        he is compliant with PAP therapy and PAP continues to benefit patient and remains necessary for control of his sleep apnea. Plan:   No orders of the defined types were placed in this encounter. *A copy of compliance data was provided to the patient and reviewed in detail. *CPAP will be continued at the above pressure settings. The patient is to contact the office if there are problems with either mask or pressure settings.   Follow-up will be scheduled at which time compliance data will be reviewed. * Patient has a history and examination consistent with the diagnosis of sleep apnea. * He was provided information on sleep apnea including corresponding risk factors and the importance of proper treatment. * Treatment options if indicated were reviewed today. Zen Marie MD, [facility]  Electronically signed 04/20/21    Pursuant to the emergency declaration under the [address] [place] [address] waiver authority and the [employer] and [employer] Act, this Virtual  Visit was conducted, with patient's consent, to reduce the patient's risk of exposure to COVID-19 and provide continuity of care for an established patient. Services were provided through a video synchronous discussion virtually to substitute for in-person clinic visit. Nichole Melendez MD    Greater than 20 minutes spent in direct video patient care, chart review and planning. This note was created using voice recognition software. Despite editing, there may be syntax errors. This note will not be viewable in [address]

## 2021-04-28 ENCOUNTER — VIRTUAL VISIT (OUTPATIENT)
Dept: FAMILY MEDICINE CLINIC | Age: 51
End: 2021-04-28
Payer: COMMERCIAL

## 2021-04-28 DIAGNOSIS — G89.29 CHRONIC LEFT SHOULDER PAIN: Primary | ICD-10-CM

## 2021-04-28 DIAGNOSIS — M25.512 CHRONIC LEFT SHOULDER PAIN: Primary | ICD-10-CM

## 2021-04-28 PROCEDURE — 99213 OFFICE O/P EST LOW 20 MIN: CPT | Performed by: FAMILY MEDICINE

## 2021-04-28 NOTE — PROGRESS NOTES
Jessica Costello is a 46 y.o. male who was seen by synchronous (real-time) audio-video technology on 4/28/2021. Consent: Jessica Costello, who was seen by synchronous (real-time) audio-video technology, and/or his healthcare decision maker, is aware that this patient-initiated, Telehealth encounter on 4/28/2021 is a billable service, with coverage as determined by his insurance carrier. He is aware that he may receive a bill and has provided verbal consent to proceed: Yes. Assessment & Plan:   1. Chronic left shoulder pain  Longstanding but worsening  Recommend he follow up with ortho at this point  Referral placed  Ok aleve bid with food as you are doing    - REFERRAL TO ORTHOPEDICS          Pt was counseled on risks, benefits and alternatives of treatment options. All questions were asked and answered and the patient was agreeable with the treatment plan as outlined. Subjective:   Jessica Costello is a 46 y.o. male who was seen for Shoulder Pain (Left, 3/10.)      L shoulder is aching every day now  Hx of old injury probably about 15 years ago  Not so bad that he feels debilitated but pain is present every single day now  He takes aleve and that eases it off    Has lost about 60 lbs since surgery  He's adapting to eating in a different way and is pleased with the results    Medications, allergies, PMH, PSH, SOCH, [facility] reviewed and updated per routine protocol, see chart for review and changes if not noted here. ROS  A 12 point review of systems was negative except as noted here or in the HPI.     Objective:   Vital Signs: (As obtained by patient/caregiver at home)  Patient-Reported Vitals 4/28/2021   Patient-Reported Weight 205lb   Patient-Reported Height -   Patient-Reported Pulse 63   Patient-Reported Systolic  -        [INSTRUCTIONS:  \"[x]\" Indicates a positive item  \"[]\" Indicates a negative item  -- DELETE ALL ITEMS NOT EXAMINED]    Constitutional: [x] Appears well-developed and well-nourished [x] No apparent distress      [] Abnormal -     Mental status: [x] Alert and awake  [x] Oriented to person/place/time [x] Able to follow commands    [] Abnormal -     Eyes:   EOM    [x]  Normal    [] Abnormal -   Sclera  [x]  Normal    [] Abnormal -          Discharge [x]  None visible   [] Abnormal -     HENT: [x] Normocephalic, atraumatic  [] Abnormal -   [x] Mouth/Throat: Mucous membranes are moist    External Ears [x] Normal  [] Abnormal -    Neck: [x] No visualized mass [] Abnormal -     Pulmonary/Chest: [x] Respiratory effort normal   [x] No visualized signs of difficulty breathing or respiratory distress        [] Abnormal -      Musculoskeletal:   [] Normal gait with no signs of ataxia         [x] Normal range of motion of neck        [] Abnormal -     Neurological:        [x] No Facial Asymmetry (Cranial nerve 7 motor function) (limited exam due to video visit)          [x] No gaze palsy        [] Abnormal -          Skin:        [x] No significant exanthematous lesions or discoloration noted on facial skin         [] Abnormal -            Psychiatric:       [x] Normal Affect [] Abnormal -        [x] No Hallucinations    Other pertinent observable physical exam findings:seated, wearing glasses    We discussed the expected course, resolution and complications of the diagnosis(es) in detail. Medication risks, benefits, costs, interactions, and alternatives were discussed as indicated. I advised him to contact the office if his condition worsens, changes or fails to improve as anticipated. He expressed understanding with the diagnosis(es) and plan. Yumiko Calderon is a 46 y.o. male who was evaluated by a video visit encounter for concerns as above. Patient identification was verified prior to start of the visit. A caregiver was present when appropriate.  Due to this being a TeleHealth encounter (During [facility]- public health emergency), evaluation of the following organ systems was limited: Vitals/Constitutional/EENT/Resp/CV/GI//MS/Neuro/Skin/Heme-Lymph-Imm. Pursuant to the emergency declaration under the [address] [place] [address], [facility] waiver authority and the [employer] Act, this Virtual  Visit was conducted, with patient's (and/or legal guardian's) consent, to reduce the patient's risk of exposure to COVID-19 and provide necessary medical care. Services were provided through a video synchronous discussion virtually to substitute for in-person clinic visit. Patient and provider were located at their individual homes. [address] [place] [address] MD Myesha  [facility]  04/28/21 11:06 AM     Portions of this note may have been populated using smart dictation software and may have \"sounds-like\" errors present.

## 2021-04-28 NOTE — PROGRESS NOTES
Chief Complaint   Patient presents with    Shoulder Pain     Left, 3/10. 1. Have you been to the ER, urgent care clinic since your last visit? Hospitalized since your last visit? No    2. Have you seen or consulted any other health care providers outside of the [address] [place] [address] since your last visit? Include any pap smears or colon screening.  No

## 2021-06-22 RX ORDER — LISINOPRIL 5 MG/1
TABLET ORAL
Qty: 90 TABLET | Refills: 3 | Status: SHIPPED | OUTPATIENT
Start: 2021-06-22 | End: 2021-08-13

## 2021-06-22 RX ORDER — SERTRALINE HYDROCHLORIDE 100 MG/1
TABLET, FILM COATED ORAL
Qty: 90 TABLET | Refills: 3 | Status: SHIPPED | OUTPATIENT
Start: 2021-06-22 | End: 2022-05-26

## 2021-07-06 ENCOUNTER — TELEPHONE (OUTPATIENT)
Dept: FAMILY MEDICINE CLINIC | Age: 51
End: 2021-07-06

## 2021-07-06 NOTE — TELEPHONE ENCOUNTER
Needs in person preop visit for this and paperwork from his surgeon brought to this visit  pls schedule appropriately--he sees myself and Dr MEJIA, could check both our schedules if ok with that but hasn't had in person visit since march 2020

## 2021-07-06 NOTE — TELEPHONE ENCOUNTER
Pt saw Dr Jennifer Walton at [facility] per Dr Kati Sanders referral and is scheduled for rotator cuff surgery on 7/27/21. States they need a clearance letter from Dr Myriam Pisano.     Pt can be reached at [phone]

## 2021-07-14 ENCOUNTER — OFFICE VISIT (OUTPATIENT)
Dept: FAMILY MEDICINE CLINIC | Age: 51
End: 2021-07-14
Payer: COMMERCIAL

## 2021-07-14 VITALS
OXYGEN SATURATION: 97 % | SYSTOLIC BLOOD PRESSURE: 112 MMHG | RESPIRATION RATE: 16 BRPM | TEMPERATURE: 97.8 F | HEART RATE: 73 BPM | HEIGHT: 70 IN | BODY MASS INDEX: 30.21 KG/M2 | WEIGHT: 211 LBS | DIASTOLIC BLOOD PRESSURE: 66 MMHG

## 2021-07-14 DIAGNOSIS — E11.9 CONTROLLED TYPE 2 DIABETES MELLITUS WITHOUT COMPLICATION, WITHOUT LONG-TERM CURRENT USE OF INSULIN (HCC): ICD-10-CM

## 2021-07-14 DIAGNOSIS — R01.1 HEART MURMUR: ICD-10-CM

## 2021-07-14 DIAGNOSIS — I10 ESSENTIAL HYPERTENSION: ICD-10-CM

## 2021-07-14 DIAGNOSIS — M75.102 TEAR OF LEFT ROTATOR CUFF, UNSPECIFIED TEAR EXTENT, UNSPECIFIED WHETHER TRAUMATIC: ICD-10-CM

## 2021-07-14 DIAGNOSIS — Z11.59 NEED FOR HEPATITIS C SCREENING TEST: ICD-10-CM

## 2021-07-14 DIAGNOSIS — Z01.818 PREOPERATIVE GENERAL PHYSICAL EXAMINATION: Primary | ICD-10-CM

## 2021-07-14 PROCEDURE — 99214 OFFICE O/P EST MOD 30 MIN: CPT | Performed by: FAMILY MEDICINE

## 2021-07-14 NOTE — PROGRESS NOTES
Preoperative Evaluation    Date of Exam: 2021    Shola Martel is a 46 y.o. male (:1970) who presents for preoperative evaluation. He will be having left shoulder surgery 21 for a torn rotator cuff. Latex Allergy: no    Problem List:     Patient Active Problem List    Diagnosis Date Noted    Obesity (BMI 30-39.9) 2018    Severe obesity with body mass index (BMI) of 35.0 to 39.9 with serious comorbidity ([facility] 2018    ROB (obstructive sleep apnea) 2016    Controlled type 2 diabetes mellitus without complication, without long-term current use of insulin ([facility] 2015    Obesity 2014    Family history of premature CAD 2014    Essential hypertension 2014    Hypercholesterolemia     Anxiety     Allergic rhinitis      Medical History:     Past Medical History:   Diagnosis Date    Allergic rhinitis     Anxiety     Essential hypertension 2014    Family history of premature CAD 2014    HTN (hypertension) 2014    Hyperlipidemia     Hypertension     Obesity 2014    ROB (obstructive sleep apnea) 3/18/2016    Prediabetes 2015     Allergies:   No Known Allergies   Medications:     Current Outpatient Medications   Medication Sig    lisinopriL (PRINIVIL, ZESTRIL) 5 mg tablet TAKE 1 TABLET BY MOUTH  DAILY    sertraline (ZOLOFT) 100 mg tablet TAKE 1 TABLET BY MOUTH  DAILY    simvastatin (ZOCOR) 40 mg tablet TAKE 1 TABLET BY MOUTH AT  NIGHT    montelukast (SINGULAIR) 10 mg tablet TAKE 1 TABLET BY MOUTH  DAILY    fenofibrate (LOFIBRA) 160 mg tablet TAKE 1 TABLET BY MOUTH  DAILY BEFORE BREAKFAST    cyanocobalamin (VITAMIN B12) 1,000 mcg/mL injection 1,000 mcg by IntraMUSCular route once.  fish oil-dha-epa 1,200-144-216 mg cap Take  by mouth.  naproxen sodium (ALEVE) 220 mg tablet Take 1 Tab by mouth two (2) times daily as needed.  esomeprazole (NEXIUM) 20 mg capsule Take  by mouth daily.     multivitamin (ONE A DAY) tablet Take 1 Tab by mouth daily.  glucosamine-chondroitin (ARTHX) 500-400 mg cap Take 1 Cap by mouth daily.  coenzyme q10 (CO Q-10) 10 mg cap Take  by mouth. Patient's takes 300 mg daily. No current facility-administered medications for this visit. Surgical History:     Past Surgical History:   Procedure Laterality Date    HX HEENT      wisdom teeth removal     Social History:     Social History     Socioeconomic History    Marital status:      Spouse name: Not on file    Number of children: Not on file    Years of education: Not on file    Highest education level: Not on file   Tobacco Use    Smoking status: Never Smoker    Smokeless tobacco: Never Used   Vaping Use    Vaping Use: Never used   Substance and Sexual Activity    Alcohol use: No    Drug use: No    Sexual activity: Yes     Partners: Female     Social Determinants of Health     Financial Resource Strain:     Difficulty of Paying Living Expenses:    Food Insecurity:     Worried About Running Out of Food in the Last Year:     [address] in the Last Year:    Transportation Needs:     Lack of Transportation (Medical):  Lack of Transportation (Non-Medical):    Physical Activity:     Days of Exercise per Week:     Minutes of Exercise per Session:    Stress:     Feeling of Stress :    Social Connections:     Frequency of Communication with Friends and Family:     Frequency of Social Gatherings with Friends and Family:     Attends Yazidi Services:     Active Member of Clubs or Organizations:     Attends Club or Organization Meetings:     Marital Status:        Anesthesia Complications: None  History of abnormal bleeding : None  History of Blood Transfusions: no  Health Care Directive or Living Will: no    Objective:     ROS:    Feeling well. No dyspnea or chest pain on exertion. No abdominal pain, change in bowel habits, black or bloody stools. No urinary tract or prostatic symptoms.  No neurological complaints. OBJECTIVE:   The patient appears well, alert, oriented x 3, in no distress. Visit Vitals  /66   Pulse 73   Temp 97.8 °F (36.6 °C) (Oral)   Resp 16   Ht 5' 10\" (1.778 m)   Wt 211 lb (95.7 kg)   SpO2 97%   BMI 30.28 kg/m²     ENT:  Neck supple. Lungs are clear, good air entry, no wheezes, rhonchi or rales. Cardiovascular:S1 and S2 normal, systolic murmur, regular rate and rhythm. Abdomen is soft without tenderness, guarding, mass or organomegaly. .    Extremities show no edema. DIAGNOSTICS:   1. EKG: EKG FINDINGS - EKG - NSR without STTW changes, normal intervals and axis, no hypertrophy   2. CXR: not indicated  3. Labs:   Lab Results   Component Value Date/Time    WBC 4.9 01/18/2021 08:45 AM    HGB 14.6 01/18/2021 08:45 AM    HCT 45.5 01/18/2021 08:45 AM    PLATELET 923 20/83/9337 08:45 AM    MCV 89.7 01/18/2021 08:45 AM     Lab Results   Component Value Date/Time    Sodium 141 01/18/2021 08:45 AM    Potassium 4.1 01/18/2021 08:45 AM    Chloride 107 01/18/2021 08:45 AM    CO2 30 01/18/2021 08:45 AM    Anion gap 4 (L) 01/18/2021 08:45 AM    Glucose 93 01/18/2021 08:45 AM    BUN 17 01/18/2021 08:45 AM    Creatinine 0.84 01/18/2021 08:45 AM    BUN/Creatinine ratio 20 01/18/2021 08:45 AM    GFR est AA >60 01/18/2021 08:45 AM    GFR est non-AA >60 01/18/2021 08:45 AM    Calcium 9.9 01/18/2021 08:45 AM      Lab Results   Component Value Date/Time    Hemoglobin A1c 5.5 01/18/2021 08:45 AM       4. Echocardiogram - LV: Estimated LVEF is 60 - 65%. Normal cavity size, wall thickness and systolic function (ejection fraction normal). Wall motion: normal.LA: Moderately dilated left atrium. IMPRESSION:     ICD-10-CM ICD-9-CM    1. Preoperative general physical examination  Z01.818 V72.83 EKG, 12 LEAD, INITIAL   2. Tear of left rotator cuff, unspecified tear extent, unspecified whether traumatic  M75.102 840.4    3. Heart murmur  R01.1 785.2 ECHO ADULT COMPLETE      EKG, 12 LEAD, INITIAL   4. Controlled type 2 diabetes mellitus without complication, without long-term current use of insulin (HCC)  R73.3 808.24 METABOLIC PANEL, BASIC      HEMOGLOBIN A1C WITH EAG   5. Essential hypertension  J71 377.2 METABOLIC PANEL, BASIC   6. Need for hepatitis C screening test  Z11.59 V73.89 HEPATITIS C AB, RFLX TO QT BY PCR       Patient seen by Cardiology and cleared by surgery  Stable chronic medical problems      Follow-up and Dispositions    · Return in about 4 months (around 11/14/2021) for diabetes, blood pressure. No contraindications to planned surgery      Reviewed plan of care. Patient has provided input and agrees with goals.     Brigette Whelan MD   7/14/2021

## 2021-07-14 NOTE — PROGRESS NOTES
Chief Complaint   Patient presents with    Pre-op Exam     preop for shoulder surgery fax note to [phone]

## 2021-07-16 ENCOUNTER — HOSPITAL ENCOUNTER (OUTPATIENT)
Dept: NON INVASIVE DIAGNOSTICS | Age: 51
Discharge: HOME | End: 2021-07-16
Attending: FAMILY MEDICINE
Payer: COMMERCIAL

## 2021-07-16 VITALS — BODY MASS INDEX: 30.21 KG/M2 | WEIGHT: 211 LBS | HEIGHT: 70 IN

## 2021-07-16 DIAGNOSIS — Z01.818 PREOPERATIVE GENERAL PHYSICAL EXAMINATION: ICD-10-CM

## 2021-07-16 DIAGNOSIS — R01.1 HEART MURMUR: ICD-10-CM

## 2021-07-16 PROCEDURE — 93005 ELECTROCARDIOGRAM TRACING: CPT

## 2021-07-16 PROCEDURE — 93306 TTE W/DOPPLER COMPLETE: CPT

## 2021-07-16 PROCEDURE — 93306 TTE W/DOPPLER COMPLETE: CPT | Performed by: SPECIALIST

## 2021-07-17 LAB
ECHO AO ROOT DIAM: 3.56 CM
ECHO AV AREA PEAK VELOCITY: 1.31 CM2
ECHO AV AREA/BSA PEAK VELOCITY: 0.6 CM2/M2
ECHO AV PEAK GRADIENT: 10.7 MMHG
ECHO AV PEAK VELOCITY: 163.53 CM/S
ECHO LA AREA 4C: 21.48 CM2
ECHO LA MAJOR AXIS: 4.45 CM
ECHO LA MINOR AXIS: 2.08 CM
ECHO LA VOL 2C: 68.98 ML (ref 18–58)
ECHO LA VOL 4C: 63.85 ML (ref 18–58)
ECHO LA VOL BP: 71.11 ML (ref 18–58)
ECHO LA VOL/BSA BIPLANE: 33.23 ML/M2 (ref 16–28)
ECHO LA VOLUME INDEX A2C: 32.23 ML/M2 (ref 16–28)
ECHO LA VOLUME INDEX A4C: 29.84 ML/M2 (ref 16–28)
ECHO LV EDV A2C: 113.42 ML
ECHO LV EDV A4C: 113.07 ML
ECHO LV EDV BP: 113.79 ML (ref 67–155)
ECHO LV EDV INDEX A4C: 52.8 ML/M2
ECHO LV EDV INDEX BP: 53.2 ML/M2
ECHO LV EDV NDEX A2C: 53 ML/M2
ECHO LV EJECTION FRACTION A2C: 79 PERCENT
ECHO LV EJECTION FRACTION A4C: 83 PERCENT
ECHO LV EJECTION FRACTION BIPLANE: 81 PERCENT (ref 55–100)
ECHO LV ESV A2C: 23.87 ML
ECHO LV ESV A4C: 19.55 ML
ECHO LV ESV BP: 21.68 ML (ref 22–58)
ECHO LV ESV INDEX A2C: 11.2 ML/M2
ECHO LV ESV INDEX A4C: 9.1 ML/M2
ECHO LV ESV INDEX BP: 10.1 ML/M2
ECHO LV INTERNAL DIMENSION DIASTOLIC: 4.2 CM (ref 4.2–5.9)
ECHO LV INTERNAL DIMENSION SYSTOLIC: 2.8 CM
ECHO LV IVSD: 0.93 CM (ref 0.6–1)
ECHO LV MASS 2D: 121.4 G (ref 88–224)
ECHO LV MASS INDEX 2D: 56.7 G/M2 (ref 49–115)
ECHO LV POSTERIOR WALL DIASTOLIC: 0.9 CM (ref 0.6–1)
ECHO LVOT DIAM: 1.67 CM
ECHO LVOT PEAK GRADIENT: 3.81 MMHG
ECHO LVOT PEAK VELOCITY: 97.63 CM/S
ECHO MV A VELOCITY: 80.27 CM/S
ECHO MV E DECELERATION TIME (DT): 201.98 MS
ECHO MV E VELOCITY: 73.25 CM/S
ECHO MV E/A RATIO: 0.91
ECHO PV PEAK INSTANTANEOUS GRADIENT SYSTOLIC: 3.08 MMHG
ECHO RV TAPSE: 1.98 CM (ref 1.5–2)
ECHO TV REGURGITANT MAX VELOCITY: 256.84 CM/S
ECHO TV REGURGITANT PEAK GRADIENT: 26.39 MMHG
LA VOL DISK BP: 67.77 ML (ref 18–58)

## 2021-07-18 LAB
ATRIAL RATE: 64 BPM
CALCULATED P AXIS, ECG09: 67 DEGREES
CALCULATED R AXIS, ECG10: 45 DEGREES
CALCULATED T AXIS, ECG11: 23 DEGREES
DIAGNOSIS, 93000: NORMAL
P-R INTERVAL, ECG05: 174 MS
Q-T INTERVAL, ECG07: 402 MS
QRS DURATION, ECG06: 98 MS
QTC CALCULATION (BEZET), ECG08: 414 MS
VENTRICULAR RATE, ECG03: 64 BPM

## 2021-07-21 ENCOUNTER — TELEPHONE (OUTPATIENT)
Dept: FAMILY MEDICINE CLINIC | Age: 51
End: 2021-07-21

## 2021-07-21 DIAGNOSIS — Z01.818 PRE-OP EVALUATION: ICD-10-CM

## 2021-07-21 DIAGNOSIS — R93.1 ABNORMAL ECHOCARDIOGRAM: Primary | ICD-10-CM

## 2021-07-21 NOTE — TELEPHONE ENCOUNTER
Please call patient and let him know his echocardiogram showed that one of the chambers of his heart is dilated and I would like for him to get Cardiology clearance before his surgery. I have printed a referral request..

## 2021-07-22 NOTE — TELEPHONE ENCOUNTER
Pt called office and has been advised and states understanding of results and agrees with plan. Pt will call cardiology and schedule appointment.

## 2021-07-28 ENCOUNTER — TELEPHONE (OUTPATIENT)
Dept: FAMILY MEDICINE CLINIC | Age: 51
End: 2021-07-28

## 2021-07-28 NOTE — TELEPHONE ENCOUNTER
Please call patient and let him know we have been trying to reach him about his abnormal echocardiogram.  He needs to see Cardiology for surgical clearance. Also, he needs to have his labs done prior to this. Orders are in [address] [place] [address].

## 2021-07-28 NOTE — TELEPHONE ENCOUNTER
Attempted to contact patient at number on file, no answer, mailbox is full unable to leave a message. Per Dr Regan Wells Please call patient and let him know we have been trying to reach him about his abnormal echocardiogram.  He needs to see Cardiology for surgical clearance. Also, he needs to have his labs done prior to this. Orders are in [address] [place] [address].

## 2021-08-13 ENCOUNTER — OFFICE VISIT (OUTPATIENT)
Dept: CARDIOLOGY CLINIC | Age: 51
End: 2021-08-13
Payer: COMMERCIAL

## 2021-08-13 VITALS
HEART RATE: 72 BPM | OXYGEN SATURATION: 99 % | HEIGHT: 70 IN | DIASTOLIC BLOOD PRESSURE: 82 MMHG | SYSTOLIC BLOOD PRESSURE: 126 MMHG | WEIGHT: 213 LBS | BODY MASS INDEX: 30.49 KG/M2

## 2021-08-13 DIAGNOSIS — I10 ESSENTIAL HYPERTENSION: ICD-10-CM

## 2021-08-13 DIAGNOSIS — Z82.49 FAMILY HISTORY OF CORONARY ARTERY DISEASE: ICD-10-CM

## 2021-08-13 DIAGNOSIS — Z01.818 PREOPERATIVE GENERAL PHYSICAL EXAMINATION: Primary | ICD-10-CM

## 2021-08-13 DIAGNOSIS — E78.00 HYPERCHOLESTEROLEMIA: ICD-10-CM

## 2021-08-13 PROCEDURE — 99204 OFFICE O/P NEW MOD 45 MIN: CPT | Performed by: STUDENT IN AN ORGANIZED HEALTH CARE EDUCATION/TRAINING PROGRAM

## 2021-08-13 RX ORDER — GLUCOSAMINE SULFATE 1500 MG
POWDER IN PACKET (EA) ORAL DAILY
COMMUNITY

## 2021-08-13 RX ORDER — VITAMIN B COMPLEX
2500 TABLET ORAL DAILY
COMMUNITY
End: 2022-07-26

## 2021-08-13 NOTE — PROGRESS NOTES
[facility] 46 [facility]    Visit Vitals  /82 (BP 1 Location: Left upper arm, BP Patient Position: Sitting, BP Cuff Size: Adult)   Pulse 72   Ht 5' 10\" (1.778 m)   Wt 213 lb (96.6 kg)   SpO2 99%   BMI 30.56 kg/m²       Chief Complaint   Patient presents with    Heart Murmur    Hypertension       Chest pain NO  SOB NO  Dizziness WHEN RISING TO STAND QUICKLY  Swelling NO  Recent hospital visit NO  Refills NO

## 2021-08-13 NOTE — PROGRESS NOTES
[facility] [address] [facility], [address] [place] [address]    Office [phone],[facility] [phone]           Shola Martel is a 46 y.o. male presents to the office for preoperative physical examination. Consult requested by Adam Hwang MD        Assessment/Recommendations:      ICD-10-CM ICD-9-CM    1. Preoperative general physical examination  Z01.818 V72.83    2. Hypercholesterolemia  E78.00 272.0    3. Essential hypertension  I10 401.9    4. Family history of coronary artery disease  Z82.49 V17.3        Preoperative cardiovascular examination    Recommend to proceed with orthopedic surgery without any further cardiovascular testing. Preoperative echocardiogram shows preserved LV function. Abnormal echocardiogram-left atrial enlargement. Suspect this is due to underlying obesity, hypertension with obstructive sleep apnea. No further cardiovascular testing required at this time. Hyperlipidemia-lipids overall controlled on moderate intensity statin    Family history of coronary artery disease-dad had a myocardial infarction in his early 62s. Recommend to maintain healthy lifestyle along with ongoing use of statin therapy      Primary Care Physician- Adam Hwang MD    Follow-up follow-up as needed      []    High complexity decision making was performed  []    Patient is at high-risk of decompensation with multiple organ involvement      Subjective:  46 y.o. presents to the office for preoperative cardiovascular examination. He is scheduled to undergo rotator cuff surgery. During his preoperative testing he underwent echocardiogram which showed evidence of normal LV function with left atrial enlargement. He was referred to cardiology for further evaluation. Patient is active, [place] regularly when he is fishing. No specific exercise regimen. He has no ongoing chest pain or chest pressure symptoms.   No orthopnea, PND or exertional dyspnea. He does have a history of hyperlipidemia and hypertension that are both well controlled. He has been on moderate intensity statin for approximately 10 years. He has strong family history of coronary artery disease with his dad having a myocardial infarction in his early 62s. Past Medical History:   Diagnosis Date    Allergic rhinitis     Anxiety     Essential hypertension 8/1/2014    Family history of premature CAD 8/1/2014    HTN (hypertension) 8/1/2014    Hyperlipidemia     Hypertension     Obesity 9/23/2014    ROB (obstructive sleep apnea) 3/18/2016    Prediabetes 12/18/2015        Past Surgical History:   Procedure Laterality Date    HX HEENT      wisdom teeth removal         Current Outpatient Medications:     cyanocobalamin (Vitamin B-12) 2,500 mcg sublingual tablet, Take 2,500 mcg by mouth daily. , Disp: , Rfl:     cholecalciferol (Vitamin D3) 25 mcg (1,000 unit) cap, Take  by mouth daily. , Disp: , Rfl:     sertraline (ZOLOFT) 100 mg tablet, TAKE 1 TABLET BY MOUTH  DAILY, Disp: 90 Tablet, Rfl: 3    simvastatin (ZOCOR) 40 mg tablet, TAKE 1 TABLET BY MOUTH AT  NIGHT, Disp: 90 Tab, Rfl: 3    montelukast (SINGULAIR) 10 mg tablet, TAKE 1 TABLET BY MOUTH  DAILY, Disp: 90 Tab, Rfl: 3    fenofibrate (LOFIBRA) 160 mg tablet, TAKE 1 TABLET BY MOUTH  DAILY BEFORE BREAKFAST, Disp: 90 Tab, Rfl: 3    naproxen sodium (ALEVE) 220 mg tablet, Take 1 Tab by mouth two (2) times daily as needed. , Disp: , Rfl:     esomeprazole (NEXIUM) 20 mg capsule, Take  by mouth daily. , Disp: , Rfl:     multivitamin (ONE A DAY) tablet, Take 1 Tab by mouth daily. , Disp: , Rfl:     glucosamine-chondroitin (ARTHX) 500-400 mg cap, Take 1 Cap by mouth daily. , Disp: , Rfl:     No Known Allergies     Family History   Problem Relation Age of Onset   [facility] Cancer Mother         lung    Lung Disease Mother     Diabetes Father     Heart Disease Father 62        CAD    Hypertension Father        Social History Tobacco Use    Smoking status: Never Smoker    Smokeless tobacco: Never Used   Vaping Use    Vaping Use: Never used   Substance Use Topics    Alcohol use: No    Drug use: No       Review of Symptoms:  Pertinent Positive:neg  Pertinent Negative:No chest pain, dyspnea on exertion, shortness of breath, orthopnea, PND    All Other systems reviewed and are negative for a Comprehensive ROS (10+)    Physical Exam    Blood pressure 126/82, pulse 72, height 5' 10\" (1.778 m), weight 213 lb (96.6 kg), SpO2 99 %. Constitutional:  well-developed and well-nourished. No distress. HENT: Normocephalic. Eyes: No scleral icterus. Neck:  Neck supple. No JVD present. Pulmonary/Chest: Effort normal and breath sounds normal. No respiratory distress, wheezes or rales. Cardiovascular: Normal rate, regular rhythm, S1 S2 . Exam reveals no gallop and no friction rub. No murmur heard. No edema. Extremities:  Normal muscle tone  Abdominal:   No abnormal distension. Neurological:  Moving all extremities, cranial nerves appear grossly intact. Skin: Skin is not cold. Not diaphoretic. No erythema. Psychiatric:  Grossly normal mood and affect. Intact insight. Objective Data: Investigations personally reviewed and interpreted              Investigations reviewed     07/16/21    ECHO ADULT COMPLETE 07/17/2021 7/17/2021    Interpretation Summary  · LV: Estimated LVEF is 60 - 65%. Normal cavity size, wall thickness and systolic function (ejection fraction normal). Wall motion: normal.  · LA: Moderately dilated left atrium. Signed by: Abby Mon MD on 7/17/2021  5:44 PM            [address], DO          ATTENTION:   This medical record was transcribed using an electronic medical records/speech recognition system. Although proofread, it may and can contain electronic, spelling and other errors. Corrections may be executed at a later time.   Please feel free to contact us for any clarifications as needed.

## 2021-08-14 ENCOUNTER — TELEPHONE (OUTPATIENT)
Dept: FAMILY MEDICINE CLINIC | Age: 51
End: 2021-08-14

## 2021-10-25 DIAGNOSIS — E78.00 HYPERCHOLESTEROLEMIA: Primary | ICD-10-CM

## 2021-10-25 DIAGNOSIS — I10 ESSENTIAL HYPERTENSION: ICD-10-CM

## 2021-10-25 DIAGNOSIS — Z11.59 NEED FOR HEPATITIS C SCREENING TEST: ICD-10-CM

## 2021-10-25 DIAGNOSIS — Z00.00 HEALTHCARE MAINTENANCE: ICD-10-CM

## 2021-10-25 DIAGNOSIS — E11.9 CONTROLLED TYPE 2 DIABETES MELLITUS WITHOUT COMPLICATION, WITHOUT LONG-TERM CURRENT USE OF INSULIN (HCC): ICD-10-CM

## 2021-10-25 DIAGNOSIS — E55.9 VITAMIN D DEFICIENCY: ICD-10-CM

## 2021-11-08 ENCOUNTER — TELEPHONE (OUTPATIENT)
Dept: FAMILY MEDICINE CLINIC | Age: 51
End: 2021-11-08

## 2021-11-08 ENCOUNTER — OFFICE VISIT (OUTPATIENT)
Dept: FAMILY MEDICINE CLINIC | Age: 51
End: 2021-11-08
Payer: COMMERCIAL

## 2021-11-08 VITALS
TEMPERATURE: 97.7 F | BODY MASS INDEX: 30.98 KG/M2 | SYSTOLIC BLOOD PRESSURE: 130 MMHG | WEIGHT: 216.4 LBS | OXYGEN SATURATION: 97 % | HEART RATE: 83 BPM | DIASTOLIC BLOOD PRESSURE: 67 MMHG | HEIGHT: 70 IN | RESPIRATION RATE: 17 BRPM

## 2021-11-08 DIAGNOSIS — L24.89 IRRITANT CONTACT DERMATITIS DUE TO OTHER AGENTS: Primary | ICD-10-CM

## 2021-11-08 DIAGNOSIS — I10 ESSENTIAL HYPERTENSION: ICD-10-CM

## 2021-11-08 DIAGNOSIS — E11.9 CONTROLLED TYPE 2 DIABETES MELLITUS WITHOUT COMPLICATION, WITHOUT LONG-TERM CURRENT USE OF INSULIN (HCC): ICD-10-CM

## 2021-11-08 PROCEDURE — 99214 OFFICE O/P EST MOD 30 MIN: CPT | Performed by: FAMILY MEDICINE

## 2021-11-08 RX ORDER — TRIAMCINOLONE ACETONIDE 1 MG/G
CREAM TOPICAL 2 TIMES DAILY
Qty: 15 G | Refills: 0 | Status: SHIPPED | OUTPATIENT
Start: 2021-11-08 | End: 2022-03-31

## 2021-11-08 RX ORDER — LISINOPRIL 5 MG/1
TABLET ORAL
COMMUNITY
Start: 2021-10-14 | End: 2021-12-17 | Stop reason: SDUPTHER

## 2021-11-08 NOTE — PROGRESS NOTES
HISTORY OF PRESENT ILLNESS  Roscoe Yang is a 46 y.o. male. Patient presents with:  Rash: both arms    The rash has been present for about 2 weeks after handling boxes at work. It is getting worse. He has tried topical Benadryl which helps with the itching. He is also due for follow up on his diabetes and HTN. Also, she still has the lab orders from the summer and plans to have them done tomorrow. Review of Systems   Eyes: Negative for blurred vision. Respiratory: Negative for shortness of breath. Cardiovascular: Negative for chest pain. Genitourinary:        No polyuria   Skin: Positive for itching and rash. Neurological: Negative for dizziness, sensory change, speech change, focal weakness and headaches. Endo/Heme/Allergies: Negative for polydipsia. Visit Vitals  /67 (BP 1 Location: Right arm, BP Patient Position: Sitting, BP Cuff Size: Adult)   Pulse 83   Temp 97.7 °F (36.5 °C) (Temporal)   Resp 17   Ht 5' 10\" (1.778 m)   Wt 216 lb 6.4 oz (98.2 kg)   SpO2 97%   BMI 31.05 kg/m²     Physical Exam  Vitals and nursing note reviewed. Constitutional:       General: He is not in acute distress. Appearance: He is well-developed. He is not diaphoretic. Cardiovascular:      Rate and Rhythm: Normal rate and regular rhythm. Heart sounds: Normal heart sounds. No murmur heard. No friction rub. No gallop. Pulmonary:      Effort: Pulmonary effort is normal. No respiratory distress. Breath sounds: Normal breath sounds. No wheezing or rales. Skin:     General: Skin is warm and dry. Comments: Feet and nails in good condition. Some scattered red papules over the forearms. Neurological:      Mental Status: He is alert and oriented to person, place, and time. Comments: Normal monofilament exam         ASSESSMENT and PLAN    ICD-10-CM ICD-9-CM    1.  Irritant contact dermatitis due to other agents  L24.89 692.89 triamcinolone acetonide (KENALOG) 0.1 % topical cream   2. Controlled type 2 diabetes mellitus without complication, without long-term current use of insulin (HCC)  E11.9 250.00 HM DIABETES FOOT EXAM   3. Essential hypertension  I10 401.9         Blood pressure controlled  Kenalog cream  No topical anti-itch preparations  Continue current plans. Get labs done  Foot exam  was performed. .  Sensory and motor testing was assessed . Pedal pulse(s) was assessed. Follow-up and Dispositions    · Return in about 4 months (around 3/8/2022) for diabetes. Reviewed plan of care. Patient has provided input and agrees with goals.

## 2021-11-08 NOTE — PROGRESS NOTES
Room:     Identified pt with two pt identifiers(name and ). Reviewed record in preparation for visit and have obtained necessary documentation. All patient medications has been reviewed. Chief Complaint   Patient presents with    Rash     both arms       Health Maintenance Due   Topic    Hepatitis C Screening     Eye Exam Retinal or Dilated     Shingrix Vaccine Age 50> (1 of 2)    Foot Exam Q1        Vitals:    21 1129   BP: 130/67   Pulse: 83   Resp: 17   Temp: 97.7 °F (36.5 °C)   TempSrc: Temporal   SpO2: 97%   Weight: 216 lb 6.4 oz (98.2 kg)   Height: 5' 10\" (1.778 m)   PainSc:   0 - No pain       4. Have you been to the ER, urgent care clinic since your last visit? Hospitalized since your last visit? No    5. Have you seen or consulted any other health care providers outside of the [address] [place] [address] since your last visit? Include any pap smears or colon screening. No    6. Would you like to receive your flu shot today? No    8. Do you have an Advanced Directive/ Living Will in place? No  If yes, do we have a copy on file No  If no, would you like information No    Patient is accompanied by self I have received verbal consent from Claudia Lowe to discuss any/all medical information while they are present in the room.

## 2021-11-08 NOTE — TELEPHONE ENCOUNTER
----- Message from Maurice Kanner sent at 11/8/2021  8:09 AM EST -----  Subject: Appointment Request    Reason for Call: Urgent Skin Problem    QUESTIONS  Type of Appointment? Established Patient  Reason for appointment request? No appointments available during search  Additional Information for Provider? The patient is calling today to   request urgent appointment for poison ivy or dermatitis. Patient states   that the condition is worsening. Please call the patient to schedule.  ---------------------------------------------------------------------------  --------------  CALL BACK INFO  What is the best way for the office to contact you? OK to leave message on   voicemail  Preferred Call Back Phone Number? [phone]  ---------------------------------------------------------------------------  --------------  SCRIPT ANSWERS  Relationship to Patient? Self  Are you having swelling in your throat or face? No  Are you having difficulty breathing? No  Have the symptoms worsened or spread in the last day? Yes  Have you been diagnosed with, awaiting test results for, or told that you   are suspected of having COVID-19 (Coronavirus)? (If patient has tested   negative or was tested as a requirement for work, school, or travel and   not based on symptoms, answer no)? No  Within the past two weeks have you developed any of the following symptoms   (answer no if symptoms have been present longer than 2 weeks or began   more than 2 weeks ago)? Fever or Chills, Cough, Shortness of breath or   difficulty breathing, Loss of taste or smell, Sore throat, Nasal   congestion, Sneezing or runny nose, Fatigue or generalized body aches   (answer no if pain is specific to a body part e.g. back pain), Diarrhea,   Headache? No  Have you had close contact with someone with COVID-19 in the last 14 days? No  (Service Expert  click yes below to proceed with [employer] As Usual   Scheduling)?  Yes

## 2021-11-20 DIAGNOSIS — E55.9 VITAMIN D DEFICIENCY: Primary | ICD-10-CM

## 2021-11-20 RX ORDER — ERGOCALCIFEROL 1.25 MG/1
50000 CAPSULE ORAL
Qty: 24 CAPSULE | Refills: 0 | Status: SHIPPED | OUTPATIENT
Start: 2021-11-22 | End: 2022-02-11

## 2021-12-17 ENCOUNTER — OFFICE VISIT (OUTPATIENT)
Dept: FAMILY MEDICINE CLINIC | Age: 51
End: 2021-12-17
Payer: COMMERCIAL

## 2021-12-17 VITALS
TEMPERATURE: 97 F | WEIGHT: 214 LBS | BODY MASS INDEX: 30.64 KG/M2 | HEIGHT: 70 IN | HEART RATE: 77 BPM | RESPIRATION RATE: 20 BRPM | OXYGEN SATURATION: 99 % | DIASTOLIC BLOOD PRESSURE: 67 MMHG | SYSTOLIC BLOOD PRESSURE: 129 MMHG

## 2021-12-17 DIAGNOSIS — I10 ESSENTIAL HYPERTENSION: ICD-10-CM

## 2021-12-17 DIAGNOSIS — M75.101 TEAR OF RIGHT ROTATOR CUFF, UNSPECIFIED TEAR EXTENT, UNSPECIFIED WHETHER TRAUMATIC: ICD-10-CM

## 2021-12-17 DIAGNOSIS — Z01.818 PREOPERATIVE GENERAL PHYSICAL EXAMINATION: Primary | ICD-10-CM

## 2021-12-17 DIAGNOSIS — E11.9 CONTROLLED TYPE 2 DIABETES MELLITUS WITHOUT COMPLICATION, WITHOUT LONG-TERM CURRENT USE OF INSULIN (HCC): ICD-10-CM

## 2021-12-17 DIAGNOSIS — G47.33 OSA (OBSTRUCTIVE SLEEP APNEA): ICD-10-CM

## 2021-12-17 DIAGNOSIS — F41.9 ANXIETY: ICD-10-CM

## 2021-12-17 PROCEDURE — 99214 OFFICE O/P EST MOD 30 MIN: CPT | Performed by: FAMILY MEDICINE

## 2021-12-17 RX ORDER — LISINOPRIL 5 MG/1
5 TABLET ORAL DAILY
Qty: 90 TABLET | Refills: 4 | Status: SHIPPED | OUTPATIENT
Start: 2021-12-17 | End: 2022-05-31

## 2021-12-17 NOTE — PROGRESS NOTES
Chief Complaint   Patient presents with    Pre-op Exam     left shoulder arthroscopic rotator cuff surgery 01/04/2022

## 2021-12-17 NOTE — PROGRESS NOTES
Subjective:     Teddi Libman is a 46 y.o. y.o. male presenting for annual exam and complete physical.      Health Habits/Lifestyle:  Occupation:  ***  Household members:  {NUMBERS:89910}, ***  Last dental appointment:   ***  Last eye exam:  ***  Uses seatbelts regularly :  {yes no:688070}  Getting regular exercise:  ***  Last colonoscopy:   2019    Patient Active Problem List   Diagnosis Code    Hypercholesterolemia E78.00    Anxiety F41.9    Allergic rhinitis J30.9    Family history of premature CAD Z80.55    Essential hypertension I10    Obesity E66.9    Controlled type 2 diabetes mellitus without complication, without long-term current use of insulin ([address] E11.9    ROB (obstructive sleep apnea) G47.33    Obesity (BMI 30-39. 9) E66.9    Severe obesity with body mass index (BMI) of 35.0 to 39.9 with serious comorbidity (HCC) E66.01    Vitamin D deficiency E55.9     Past Medical History:   Diagnosis Date    Allergic rhinitis     Anxiety     Essential hypertension 8/1/2014    Family history of premature CAD 8/1/2014    HTN (hypertension) 8/1/2014    Hyperlipidemia     Hypertension     Obesity 9/23/2014    ROB (obstructive sleep apnea) 3/18/2016    Prediabetes 12/18/2015    Vitamin D deficiency 10/25/2021     Past Surgical History:   Procedure Laterality Date    HX HEENT      wisdom teeth removal      Family History   Problem Relation Age of Onset    Cancer Mother         lung    Lung Disease Mother     Diabetes Father     Heart Disease Father 62        CAD    Hypertension Father      Social History     Tobacco Use    Smoking status: Never Smoker    Smokeless tobacco: Never Used   Substance Use Topics    Alcohol use: No     No Known Allergies  Current Outpatient Medications   Medication Sig Dispense Refill    ergocalciferol (ERGOCALCIFEROL) 1,250 mcg (50,000 unit) capsule Take 1 Capsule by mouth every Monday and Thursday for 24 doses.  Take for 12 weeks 24 Capsule 0    lisinopriL (PRINIVIL, ZESTRIL) 5 mg tablet       triamcinolone acetonide (KENALOG) 0.1 % topical cream Apply  to affected area two (2) times a day. 15 g 0    cyanocobalamin (Vitamin B-12) 2,500 mcg sublingual tablet Take 2,500 mcg by mouth daily.  cholecalciferol (Vitamin D3) 25 mcg (1,000 unit) cap Take  by mouth daily.  sertraline (ZOLOFT) 100 mg tablet TAKE 1 TABLET BY MOUTH  DAILY 90 Tablet 3    simvastatin (ZOCOR) 40 mg tablet TAKE 1 TABLET BY MOUTH AT  NIGHT 90 Tab 3    montelukast (SINGULAIR) 10 mg tablet TAKE 1 TABLET BY MOUTH  DAILY 90 Tab 3    fenofibrate (LOFIBRA) 160 mg tablet TAKE 1 TABLET BY MOUTH  DAILY BEFORE BREAKFAST 90 Tab 3    naproxen sodium (ALEVE) 220 mg tablet Take 1 Tab by mouth two (2) times daily as needed.  esomeprazole (NEXIUM) 20 mg capsule Take  by mouth daily.  multivitamin (ONE A DAY) tablet Take 1 Tab by mouth daily.  glucosamine-chondroitin (ARTHX) 500-400 mg cap Take 1 Cap by mouth daily. Review of Systems  {ros - complete:07292}    Objective: There were no vitals taken for this visit.   Physical exam:   General appearance - {appearance:284445::\"alert, well appearing, and in no distress\"}  Mental status - {pe mental status_general use:311518::\"alert, oriented to person, place, and time\"}  Eyes - {pe eye exam abnormal findings:627317::\"pupils equal and reactive, extraocular eye movements intact\"}  Ears - {pe ears normal/abnormal:675365::\"bilateral TM's and external ear canals normal\"}  Nose - {pe nose:301839::\"normal and patent, no erythema, discharge or polyps\"}  Mouth - {mouth:332717::\"mucous membranes moist, pharynx normal without lesions\"}  Neck - {pe neck:120290::\"supple, no significant adenopathy\"}  Lymphatics - {lymphatics:054021::\"no palpable lymphadenopathy\",\"no hepatosplenomegaly\"}  Chest - {chest:639648::\"clear to auscultation, no wheezes, rales or rhonchi, symmetric air entry\"}  Heart - {heart exam:943315::\"normal rate, regular rhythm, normal S1, S2, no murmurs, rubs, clicks or gallops\"}  Abdomen - {abd exam:265797::\"soft, nontender, nondistended, no masses or organomegaly\"}   Male - {pe male genitalia:688154::\"no penile lesions or discharge, no testicular masses or tenderness, no hernias\"}  PROSTATE EXAM: {pe prostate:270150::\"smooth and symmetric without nodules or tenderness\"}  Rectal - {rectal:287801::\"negative without mass, lesions or tenderness\"}  Neurological - {neuro:711512::\"alert, oriented, normal speech, no focal findings or movement disorder noted\"}  Musculoskeletal - {msk exam:905854::\"normal gait\"}  Extremities - {extremities:380855::\"peripheral pulses normal, no pedal edema, no clubbing or cyanosis\"}  Skin - {skin exam:957074::\"normal coloration and turgor, no rashes, no suspicious skin lesions noted\"}     Assessment/Plan:     {Assessment and Plan:68536}    ***.     {Assessment and Plan:95038}. Reviewed plan of care. Patient has provided input and agrees with goals.

## 2021-12-17 NOTE — PROGRESS NOTES
Preoperative Evaluation    Date of Exam: 2021    Soraida Pfeiffer is a 46 y.o. male (:1970) who presents for preoperative evaluation. Her will be having a right arthroscopic rotator cuff repair 22. Latex Allergy: no    Problem List:     Patient Active Problem List    Diagnosis Date Noted    Vitamin D deficiency 10/25/2021    Obesity (BMI 30-39.9) 2018    ROB (obstructive sleep apnea) 2016    Controlled type 2 diabetes mellitus without complication, without long-term current use of insulin ([facility] 2015    Family history of premature CAD 2014    Essential hypertension 2014    Hypercholesterolemia     Anxiety     Allergic rhinitis      Medical History:     Past Medical History:   Diagnosis Date    Allergic rhinitis     Anxiety     Essential hypertension 2014    Family history of premature CAD 2014    HTN (hypertension) 2014    Hyperlipidemia     Hypertension     Obesity 2014    ROB (obstructive sleep apnea) 3/18/2016    Prediabetes 2015    Vitamin D deficiency 10/25/2021     Allergies:   No Known Allergies   Medications:     Current Outpatient Medications   Medication Sig    ergocalciferol (ERGOCALCIFEROL) 1,250 mcg (50,000 unit) capsule Take 1 Capsule by mouth every Monday and Thursday for 24 doses. Take for 12 weeks    lisinopriL (PRINIVIL, ZESTRIL) 5 mg tablet     triamcinolone acetonide (KENALOG) 0.1 % topical cream Apply  to affected area two (2) times a day.  cyanocobalamin (Vitamin B-12) 2,500 mcg sublingual tablet Take 2,500 mcg by mouth daily.  cholecalciferol (Vitamin D3) 25 mcg (1,000 unit) cap Take  by mouth daily.     sertraline (ZOLOFT) 100 mg tablet TAKE 1 TABLET BY MOUTH  DAILY    simvastatin (ZOCOR) 40 mg tablet TAKE 1 TABLET BY MOUTH AT  NIGHT    montelukast (SINGULAIR) 10 mg tablet TAKE 1 TABLET BY MOUTH  DAILY    fenofibrate (LOFIBRA) 160 mg tablet TAKE 1 TABLET BY MOUTH  DAILY BEFORE BREAKFAST    naproxen sodium (ALEVE) 220 mg tablet Take 1 Tab by mouth two (2) times daily as needed.  esomeprazole (NEXIUM) 20 mg capsule Take  by mouth daily.  multivitamin (ONE A DAY) tablet Take 1 Tab by mouth daily.  glucosamine-chondroitin (ARTHX) 500-400 mg cap Take 1 Cap by mouth daily. No current facility-administered medications for this visit. Surgical History:     Past Surgical History:   Procedure Laterality Date    HX HEENT      wisdom teeth removal     Social History:     Social History     Socioeconomic History    Marital status:    Tobacco Use    Smoking status: Never Smoker    Smokeless tobacco: Never Used   Vaping Use    Vaping Use: Never used   Substance and Sexual Activity    Alcohol use: No    Drug use: No    Sexual activity: Yes     Partners: Female       Anesthesia Complications: None  History of abnormal bleeding : None  History of Blood Transfusions: no  Health Care Directive or Living Will: no    Objective:     ROS:    Feeling well. No dyspnea or chest pain on exertion. No abdominal pain, change in bowel habits, black or bloody stools. No urinary tract or prostatic symptoms. No neurological complaints. OBJECTIVE:   The patient appears well, alert, oriented x 3, in no distress. Visit Vitals  /67   Pulse 77   Temp 97 °F (36.1 °C) (Temporal)   Resp 20   Ht 5' 10\" (1.778 m)   Wt 214 lb (97.1 kg)   SpO2 99%   BMI 30.71 kg/m²     ENT  Neck supple. Lungs are clear, good air entry, no wheezes, rhonchi or rales. Cardiovascular:S1 and S2 normal, no murmurs, regular rate and rhythm. Extremities show no edema. Neurological is normal without focal findings. DIAGNOSTICS:   Lab Results   Component Value Date/Time    Hemoglobin A1c 6.0 (H) 11/09/2021 08:27 AM         IMPRESSION:     ICD-10-CM ICD-9-CM    1. Preoperative general physical examination  Z01.818 V72.83    2.  Tear of right rotator cuff, unspecified tear extent, unspecified whether traumatic  M75.101 840.4    3. Controlled type 2 diabetes mellitus without complication, without long-term current use of insulin (HCC)  E11.9 250.00    4. Essential hypertension  I10 401.9 lisinopriL (PRINIVIL, ZESTRIL) 5 mg tablet   5. Anxiety  F41.9 300.00    6. ROB (obstructive sleep apnea)  G47.33 327.23          Controlled diabetes and HTN    Follow-up and Dispositions    · Return in about 3 months (around 3/17/2022) for diabetes. No contraindications to planned surgery      Reviewed plan of care.   Patient has provided input and agrees with goals    Tammy Becker MD   12/17/2021

## 2022-02-26 RX ORDER — SIMVASTATIN 40 MG/1
TABLET, FILM COATED ORAL
Qty: 90 TABLET | Refills: 2 | Status: SHIPPED | OUTPATIENT
Start: 2022-02-26

## 2022-03-19 PROBLEM — E55.9 VITAMIN D DEFICIENCY: Status: ACTIVE | Noted: 2021-10-25

## 2022-03-20 PROBLEM — E66.9 OBESITY (BMI 30-39.9): Status: ACTIVE | Noted: 2018-09-21

## 2022-03-23 ENCOUNTER — TELEPHONE (OUTPATIENT)
Dept: FAMILY MEDICINE CLINIC | Age: 52
End: 2022-03-23

## 2022-03-23 NOTE — TELEPHONE ENCOUNTER
Left a message on patient's personal VM, he has a future order for a Vitamin D which can be accessed through [facility] or [facility] otherwise we can send to the [facility] of his choice. Patient has a f/u appt with Dr. Mariana Kurtz on 03/29/22 at which time his labs can be ordered during his visit.

## 2022-03-31 ENCOUNTER — VIRTUAL VISIT (OUTPATIENT)
Dept: FAMILY MEDICINE CLINIC | Age: 52
End: 2022-03-31
Payer: COMMERCIAL

## 2022-03-31 ENCOUNTER — TELEPHONE (OUTPATIENT)
Dept: FAMILY MEDICINE CLINIC | Age: 52
End: 2022-03-31

## 2022-03-31 DIAGNOSIS — E55.9 VITAMIN D DEFICIENCY: ICD-10-CM

## 2022-03-31 DIAGNOSIS — E11.9 CONTROLLED TYPE 2 DIABETES MELLITUS WITHOUT COMPLICATION, WITHOUT LONG-TERM CURRENT USE OF INSULIN (HCC): Primary | ICD-10-CM

## 2022-03-31 PROCEDURE — 99214 OFFICE O/P EST MOD 30 MIN: CPT | Performed by: FAMILY MEDICINE

## 2022-03-31 PROCEDURE — 3044F HG A1C LEVEL LT 7.0%: CPT | Performed by: FAMILY MEDICINE

## 2022-03-31 NOTE — PROGRESS NOTES
Chief Complaint   Patient presents with    Diabetes     Follow up     1. Have you been to the ER, urgent care clinic since your last visit? Hospitalized since your last visit? No    2. Have you seen or consulted any other health care providers outside of the [address] [place] [address] since your last visit? Include any pap smears or colon screening.  No

## 2022-05-19 NOTE — TELEPHONE ENCOUNTER
----- Message from Oscar Casper sent at 5/19/2022  8:55 AM EDT -----  Regarding: refill request  Will you refill my Montelukast Sodium 10mg prescription at [facility]? thank you!

## 2022-05-23 RX ORDER — MONTELUKAST SODIUM 10 MG/1
10 TABLET ORAL DAILY
Qty: 90 TABLET | Refills: 3 | Status: SHIPPED | OUTPATIENT
Start: 2022-05-23 | End: 2022-07-26 | Stop reason: SDUPTHER

## 2022-05-26 RX ORDER — SERTRALINE HYDROCHLORIDE 100 MG/1
TABLET, FILM COATED ORAL
Qty: 90 TABLET | Refills: 0 | Status: SHIPPED | OUTPATIENT
Start: 2022-05-26 | End: 2022-08-19

## 2022-05-30 DIAGNOSIS — I10 ESSENTIAL HYPERTENSION: ICD-10-CM

## 2022-05-31 RX ORDER — LISINOPRIL 5 MG/1
5 TABLET ORAL DAILY
Qty: 90 TABLET | Refills: 0 | Status: SHIPPED | OUTPATIENT
Start: 2022-05-31

## 2022-06-14 DIAGNOSIS — Z30.09 UNWANTED FERTILITY: Primary | ICD-10-CM

## 2022-07-01 ENCOUNTER — TELEPHONE (OUTPATIENT)
Dept: FAMILY MEDICINE CLINIC | Age: 52
End: 2022-07-01

## 2022-07-01 DIAGNOSIS — I10 ESSENTIAL HYPERTENSION: ICD-10-CM

## 2022-07-01 DIAGNOSIS — E11.9 CONTROLLED TYPE 2 DIABETES MELLITUS WITHOUT COMPLICATION, WITHOUT LONG-TERM CURRENT USE OF INSULIN (HCC): Primary | ICD-10-CM

## 2022-07-01 NOTE — TELEPHONE ENCOUNTER
Pt called office. Has appointment on 07/08/2022 and will like to have labs drawn prior to appointment.

## 2022-07-20 DIAGNOSIS — I10 ESSENTIAL HYPERTENSION: ICD-10-CM

## 2022-07-20 DIAGNOSIS — E11.9 CONTROLLED TYPE 2 DIABETES MELLITUS WITHOUT COMPLICATION, WITHOUT LONG-TERM CURRENT USE OF INSULIN (HCC): ICD-10-CM

## 2022-07-20 LAB
ANION GAP SERPL CALC-SCNC: 7 MMOL/L (ref 5–15)
BUN SERPL-MCNC: 22 MG/DL (ref 6–20)
BUN/CREAT SERPL: 28 (ref 12–20)
CALCIUM SERPL-MCNC: 9.8 MG/DL (ref 8.5–10.1)
CHLORIDE SERPL-SCNC: 103 MMOL/L (ref 97–108)
CO2 SERPL-SCNC: 27 MMOL/L (ref 21–32)
CREAT SERPL-MCNC: 0.79 MG/DL (ref 0.7–1.3)
EST. AVERAGE GLUCOSE BLD GHB EST-MCNC: 128 MG/DL
GLUCOSE SERPL-MCNC: 94 MG/DL (ref 65–100)
HBA1C MFR BLD: 6.1 % (ref 4–5.6)
POTASSIUM SERPL-SCNC: 4.3 MMOL/L (ref 3.5–5.1)
SODIUM SERPL-SCNC: 137 MMOL/L (ref 136–145)

## 2022-07-26 ENCOUNTER — VIRTUAL VISIT (OUTPATIENT)
Dept: FAMILY MEDICINE CLINIC | Age: 52
End: 2022-07-26
Payer: COMMERCIAL

## 2022-07-26 DIAGNOSIS — I10 ESSENTIAL HYPERTENSION: ICD-10-CM

## 2022-07-26 DIAGNOSIS — E78.00 HYPERCHOLESTEROLEMIA: ICD-10-CM

## 2022-07-26 DIAGNOSIS — E11.9 CONTROLLED TYPE 2 DIABETES MELLITUS WITHOUT COMPLICATION, WITHOUT LONG-TERM CURRENT USE OF INSULIN (HCC): Primary | ICD-10-CM

## 2022-07-26 PROCEDURE — 99214 OFFICE O/P EST MOD 30 MIN: CPT | Performed by: FAMILY MEDICINE

## 2022-07-26 PROCEDURE — 3044F HG A1C LEVEL LT 7.0%: CPT | Performed by: FAMILY MEDICINE

## 2022-07-26 RX ORDER — MONTELUKAST SODIUM 10 MG/1
10 TABLET ORAL DAILY
Qty: 90 TABLET | Refills: 4 | Status: SHIPPED | OUTPATIENT
Start: 2022-07-26

## 2022-07-26 NOTE — PROGRESS NOTES
Chief Complaint   Patient presents with    Diabetes     Follow up. 1. Have you been to the ER, urgent care clinic since your last visit? Hospitalized since your last visit? Yes, 07/15/22 [facility]. 2. Have you seen or consulted any other health care providers outside of the [address] [place] [address] since your last visit? Include any pap smears or colon screening. No    Patient states that he does not check his glucose.

## 2022-07-26 NOTE — PROGRESS NOTES
Jose Blancas is a 45 y.o. male who was seen by synchronous (real-time) audio-video technology on 7/26/2022. Assessment & Plan:   Diagnoses and all orders for this visit:    1. Controlled type 2 diabetes mellitus without complication, without long-term current use of insulin (HCC)  -     METABOLIC PANEL, BASIC; Future  -     HEMOGLOBIN A1C WITH EAG; Future  -     MICROALBUMIN, UR, RAND W/ MICROALB/CREAT RATIO; Future    2. Essential hypertension  -     METABOLIC PANEL, BASIC; Future    3. Hypercholesterolemia  -     HEPATIC FUNCTION PANEL; Future  -     LIPID PANEL; Future      Blood pressure controlled  Continue current plans. Labs per orders prior to next visit    Follow-up and Dispositions    Return in about 4 months (around 11/26/2022) for diabetes, blood pressure. Reviewed plan of care. Patient has provided input and agrees with goals. CPT Codes 14177-23780 for Established Patients may apply to this Telehealth Visit      Subjective:   Jose Blancas was seen for Diabetes (Follow up.)      Patient presents with:  Diabetes: Follow up. His recent A1C was 6.1. He also needs to follow up on his HTN. Review of Systems   Eyes:  Negative for blurred vision. Respiratory:  Negative for shortness of breath. Cardiovascular:  Negative for chest pain. Genitourinary:         No polyuria   Neurological:  Negative for dizziness, sensory change, speech change, focal weakness and headaches. Endo/Heme/Allergies:  Negative for polydipsia. Objective:   /70    BP Readings from Last 3 Encounters:   12/17/21 129/67   11/08/21 130/67   08/13/21 126/82       Physical Exam  Constitutional:       General: He is not in acute distress. Appearance: Normal appearance. Neurological:      Mental Status: He is alert and oriented to person, place, and time.        Due to this being a TeleHealth evaluation, many elements of the physical examination are unable to be assessed. We discussed the expected course, resolution and complications of the diagnosis(es) in detail. Medication risks, benefits, costs, interactions, and alternatives were discussed as indicated. I advised him to contact the office if his condition worsens, changes or fails to improve as anticipated. He expressed understanding with the diagnosis(es) and plan. Pursuant to the emergency declaration under the [address] [place] [address] waiver authority and the [facility] and [employer] Act, this Virtual  Visit was conducted, with patient's consent, to reduce the patient's risk of exposure to COVID-19 and provide continuity of care for an established patient. Services were provided through a video synchronous discussion virtually to substitute for in-person clinic visit.     Sultana Ríos MD

## 2022-08-19 RX ORDER — SERTRALINE HYDROCHLORIDE 100 MG/1
TABLET, FILM COATED ORAL
Qty: 90 TABLET | Refills: 3 | Status: SHIPPED | OUTPATIENT
Start: 2022-08-19

## 2022-11-05 RX ORDER — SIMVASTATIN 40 MG/1
TABLET, FILM COATED ORAL
Qty: 90 TABLET | Refills: 0 | Status: SHIPPED | OUTPATIENT
Start: 2022-11-05

## 2022-11-05 NOTE — TELEPHONE ENCOUNTER
Please call patient and let them know I have given them one refill for now, but they need labs drawn prior to more. He should have a lab order.

## 2022-12-28 ENCOUNTER — OFFICE VISIT (OUTPATIENT)
Dept: FAMILY MEDICINE CLINIC | Age: 52
End: 2022-12-28
Payer: COMMERCIAL

## 2022-12-28 ENCOUNTER — TELEPHONE (OUTPATIENT)
Dept: FAMILY MEDICINE CLINIC | Age: 52
End: 2022-12-28

## 2022-12-28 VITALS
RESPIRATION RATE: 18 BRPM | TEMPERATURE: 97 F | DIASTOLIC BLOOD PRESSURE: 82 MMHG | OXYGEN SATURATION: 96 % | SYSTOLIC BLOOD PRESSURE: 122 MMHG | HEIGHT: 70 IN | WEIGHT: 229 LBS | HEART RATE: 90 BPM | BODY MASS INDEX: 32.78 KG/M2

## 2022-12-28 DIAGNOSIS — E78.00 HYPERCHOLESTEROLEMIA: ICD-10-CM

## 2022-12-28 DIAGNOSIS — J30.1 ALLERGIC RHINITIS DUE TO POLLEN, UNSPECIFIED SEASONALITY: ICD-10-CM

## 2022-12-28 DIAGNOSIS — I10 ESSENTIAL HYPERTENSION: ICD-10-CM

## 2022-12-28 DIAGNOSIS — E11.9 CONTROLLED TYPE 2 DIABETES MELLITUS WITHOUT COMPLICATION, WITHOUT LONG-TERM CURRENT USE OF INSULIN (HCC): Primary | ICD-10-CM

## 2022-12-28 PROCEDURE — 99214 OFFICE O/P EST MOD 30 MIN: CPT | Performed by: FAMILY MEDICINE

## 2022-12-28 PROCEDURE — 3044F HG A1C LEVEL LT 7.0%: CPT | Performed by: FAMILY MEDICINE

## 2022-12-28 PROCEDURE — 3074F SYST BP LT 130 MM HG: CPT | Performed by: FAMILY MEDICINE

## 2022-12-28 PROCEDURE — 3078F DIAST BP <80 MM HG: CPT | Performed by: FAMILY MEDICINE

## 2022-12-28 RX ORDER — MONTELUKAST SODIUM 10 MG/1
10 TABLET ORAL DAILY
Qty: 90 TABLET | Refills: 4 | Status: SHIPPED | OUTPATIENT
Start: 2022-12-28

## 2022-12-28 RX ORDER — ROSUVASTATIN CALCIUM 20 MG/1
20 TABLET, COATED ORAL
Qty: 90 TABLET | Refills: 0 | Status: SHIPPED | OUTPATIENT
Start: 2022-12-28

## 2022-12-28 RX ORDER — ATORVASTATIN CALCIUM 20 MG/1
20 TABLET, FILM COATED ORAL
Qty: 30 TABLET | Refills: 3 | Status: SHIPPED | OUTPATIENT
Start: 2022-12-28 | End: 2022-12-28 | Stop reason: ALTCHOICE

## 2022-12-28 NOTE — PROGRESS NOTES
Mark Bustillo is a 45 y.o. male    Chief Complaint   Patient presents with    Follow-up     4 month    Medication Refill       Visit Vitals  /82   Pulse 90   Temp 97 °F (36.1 °C) (Temporal)   Resp 18   Ht 5' 10\" (1.778 m)   Wt 229 lb (103.9 kg)   SpO2 96%   BMI 32.86 kg/m²       3 most recent PHQ Screens 12/28/2022   Little interest or pleasure in doing things Not at all   Feeling down, depressed, irritable, or hopeless Not at all   Total Score PHQ 2 0       No flowsheet data found. No flowsheet data found. 1. Have you been to the ER, urgent care clinic since your last visit? Hospitalized since your last visit? No     2. Have you seen or consulted any other health care providers outside of the [address] [place] [address] since your last visit? Include any pap smears or colon screening.   No

## 2022-12-28 NOTE — PROGRESS NOTES
HISTORY OF PRESENT ILLNESS  Raul Pang is a 45 y.o. male. Raul Pang is here for diabetic follow up. He also needs to follow up on his HTN. His recent A1C was 5.9 and his LDL was 91.3. He has been out of fenofibrate for about a month. Review of Systems   Eyes:  Negative for blurred vision. Respiratory:  Negative for shortness of breath. Cardiovascular:  Negative for chest pain. Genitourinary:         No polyuria   Neurological:  Negative for dizziness, sensory change, speech change, focal weakness and headaches. Endo/Heme/Allergies:  Negative for polydipsia. Visit Vitals  /82   Pulse 90   Temp 97 °F (36.1 °C) (Temporal)   Resp 18   Ht 5' 10\" (1.778 m)   Wt 229 lb (103.9 kg)   SpO2 96%   BMI 32.86 kg/m²     Physical Exam  Vitals and nursing note reviewed. Constitutional:       General: He is not in acute distress. Appearance: He is well-developed. He is not diaphoretic. Cardiovascular:      Rate and Rhythm: Normal rate and regular rhythm. Heart sounds: Normal heart sounds. No murmur heard. No friction rub. No gallop. Pulmonary:      Effort: Pulmonary effort is normal. No respiratory distress. Breath sounds: Normal breath sounds. No wheezing or rales. Skin:     General: Skin is warm and dry. Comments: Feet and nails in good condition. Neurological:      Mental Status: He is alert and oriented to person, place, and time. Comments: Normal monofilament exam       ASSESSMENT and PLAN    ICD-10-CM ICD-9-CM    1. Controlled type 2 diabetes mellitus without complication, without long-term current use of insulin ([facility])  E11.9 250.00  DIABETES FOOT EXAM      2. Essential hypertension  I10 401.9       3. Hypercholesterolemia  E78.00 272.0 LIPID PANEL      HEPATIC FUNCTION PANEL      rosuvastatin (Crestor) 20 mg tablet      DISCONTINUED: atorvastatin (Lipitor) 20 mg tablet      4.  Allergic rhinitis due to pollen, unspecified seasonality  J30.1 477.0 montelukast (SINGULAIR) 10 mg tablet          Diabetes and blood pressure controlled  LDL not at goal, mildly elevated TRGs  Stop Zocor  Do not restart fenofibrate  Start Crestor with lipid labs in 3 months  Foot exam  was performed. .  Sensory and motor testing was assessed . Pedal pulse(s) was assessed. Follow-up and Dispositions    Return in about 4 months (around 4/28/2023) for diabetes, blood pressure. Reviewed plan of care. Patient has provided input and agrees with goals.

## 2023-05-19 LAB
ALBUMIN SERPL-MCNC: 4 G/DL (ref 3.5–5)
ALBUMIN/GLOB SERPL: 1.3 (ref 1.1–2.2)
ALP SERPL-CCNC: 70 U/L (ref 45–117)
ALT SERPL-CCNC: 40 U/L (ref 12–78)
AST SERPL-CCNC: 28 U/L (ref 15–37)
BILIRUB DIRECT SERPL-MCNC: <0.1 MG/DL (ref 0–0.2)
BILIRUB SERPL-MCNC: 0.4 MG/DL (ref 0.2–1)
CHOLEST SERPL-MCNC: 163 MG/DL
GLOBULIN SER CALC-MCNC: 3 G/DL (ref 2–4)
HDLC SERPL-MCNC: 56 MG/DL
HDLC SERPL: 2.9 (ref 0–5)
LDLC SERPL CALC-MCNC: 79.4 MG/DL (ref 0–100)
PROT SERPL-MCNC: 7 G/DL (ref 6.4–8.2)
TRIGL SERPL-MCNC: 138 MG/DL
VLDLC SERPL CALC-MCNC: 27.6 MG/DL

## 2023-05-19 RX ORDER — NAPROXEN SODIUM 220 MG
220 TABLET ORAL 2 TIMES DAILY PRN
COMMUNITY
Start: 2016-09-29

## 2023-05-19 RX ORDER — LISINOPRIL 5 MG/1
5 TABLET ORAL DAILY
COMMUNITY
Start: 2022-05-31

## 2023-05-19 RX ORDER — ROSUVASTATIN CALCIUM 20 MG/1
1 TABLET, COATED ORAL NIGHTLY
COMMUNITY
Start: 2022-12-28 | End: 2023-05-30

## 2023-05-19 RX ORDER — SERTRALINE HYDROCHLORIDE 100 MG/1
1 TABLET, FILM COATED ORAL DAILY
COMMUNITY
Start: 2022-08-19

## 2023-05-19 RX ORDER — FENOFIBRATE 160 MG/1
TABLET ORAL
COMMUNITY
Start: 2020-10-19

## 2023-05-19 RX ORDER — SODIUM PHOSPHATE,MONO-DIBASIC 19G-7G/118
1 ENEMA (ML) RECTAL DAILY
COMMUNITY

## 2023-05-19 RX ORDER — MONTELUKAST SODIUM 10 MG/1
10 TABLET ORAL DAILY
COMMUNITY
Start: 2022-12-28 | End: 2023-05-30 | Stop reason: SDUPTHER

## 2023-05-30 ENCOUNTER — TELEMEDICINE (OUTPATIENT)
Facility: CLINIC | Age: 53
End: 2023-05-30
Payer: COMMERCIAL

## 2023-05-30 DIAGNOSIS — J30.1 ALLERGIC RHINITIS DUE TO POLLEN, UNSPECIFIED SEASONALITY: ICD-10-CM

## 2023-05-30 DIAGNOSIS — I10 ESSENTIAL HYPERTENSION: ICD-10-CM

## 2023-05-30 DIAGNOSIS — E78.00 HYPERCHOLESTEROLEMIA: ICD-10-CM

## 2023-05-30 DIAGNOSIS — E11.9 CONTROLLED TYPE 2 DIABETES MELLITUS WITHOUT COMPLICATION, WITHOUT LONG-TERM CURRENT USE OF INSULIN (HCC): Primary | ICD-10-CM

## 2023-05-30 PROCEDURE — 4004F PT TOBACCO SCREEN RCVD TLK: CPT | Performed by: FAMILY MEDICINE

## 2023-05-30 PROCEDURE — G8428 CUR MEDS NOT DOCUMENT: HCPCS | Performed by: FAMILY MEDICINE

## 2023-05-30 PROCEDURE — 99214 OFFICE O/P EST MOD 30 MIN: CPT | Performed by: FAMILY MEDICINE

## 2023-05-30 PROCEDURE — 2022F DILAT RTA XM EVC RTNOPTHY: CPT | Performed by: FAMILY MEDICINE

## 2023-05-30 PROCEDURE — 3046F HEMOGLOBIN A1C LEVEL >9.0%: CPT | Performed by: FAMILY MEDICINE

## 2023-05-30 PROCEDURE — G8417 CALC BMI ABV UP PARAM F/U: HCPCS | Performed by: FAMILY MEDICINE

## 2023-05-30 PROCEDURE — 3017F COLORECTAL CA SCREEN DOC REV: CPT | Performed by: FAMILY MEDICINE

## 2023-05-30 RX ORDER — MONTELUKAST SODIUM 10 MG/1
10 TABLET ORAL DAILY
Qty: 90 TABLET | Refills: 4 | Status: SHIPPED | OUTPATIENT
Start: 2023-05-30

## 2023-05-30 RX ORDER — ATORVASTATIN CALCIUM 40 MG/1
40 TABLET, FILM COATED ORAL DAILY
COMMUNITY
End: 2023-05-30 | Stop reason: SDUPTHER

## 2023-05-30 RX ORDER — ATORVASTATIN CALCIUM 40 MG/1
40 TABLET, FILM COATED ORAL DAILY
Qty: 90 TABLET | Refills: 4 | Status: SHIPPED | OUTPATIENT
Start: 2023-05-30

## 2023-05-30 ASSESSMENT — PATIENT HEALTH QUESTIONNAIRE - PHQ9
SUM OF ALL RESPONSES TO PHQ QUESTIONS 1-9: 0
2. FEELING DOWN, DEPRESSED OR HOPELESS: 0
SUM OF ALL RESPONSES TO PHQ QUESTIONS 1-9: 0
1. LITTLE INTEREST OR PLEASURE IN DOING THINGS: 0
SUM OF ALL RESPONSES TO PHQ9 QUESTIONS 1 & 2: 0
SUM OF ALL RESPONSES TO PHQ QUESTIONS 1-9: 0
SUM OF ALL RESPONSES TO PHQ QUESTIONS 1-9: 0

## 2023-05-30 ASSESSMENT — ENCOUNTER SYMPTOMS: SHORTNESS OF BREATH: 0

## 2023-05-30 NOTE — PROGRESS NOTES
Tiffanie Kaur (:  1970) is a Established patient, presenting virtually for evaluation of the following:    Assessment & Plan   Below is the assessment and plan developed based on review of pertinent history, physical exam, labs, studies, and medications. 1. Controlled type 2 diabetes mellitus without complication, without long-term current use of insulin (HCC)  -     Hemoglobin A1C; Future  2. Essential hypertension  3. Hypercholesterolemia  -     atorvastatin (LIPITOR) 40 MG tablet; Take 1 tablet by mouth daily, Disp-90 tablet, R-4Normal  4. Allergic rhinitis due to pollen, unspecified seasonality  -     montelukast (SINGULAIR) 10 MG tablet; Take 1 tablet by mouth daily, Disp-90 tablet, R-4Normal    Unknown blood pressure  LDL close to goal  Labs per orders. Continue current plans. Refills per orders    Return in about 4 months (around 2023) for Diabetes, HTN. Subjective   Patient presents with:  Diabetes: Follow up- no updated blood sugar     He also, needs to follow up on his HTN. Unfortunately, he does not know his blood pressure today. Review of Systems   Eyes:  Negative for visual disturbance. Respiratory:  Negative for shortness of breath. Cardiovascular:  Negative for chest pain. Endocrine: Negative for polydipsia and polyuria. Neurological:  Negative for dizziness, speech difficulty, weakness, numbness and headaches. Objective   Patient-Reported Vitals  No data recorded     Physical Exam  Constitutional:       General: He is not in acute distress. Appearance: Normal appearance. Neurological:      Mental Status: He is alert and oriented to person, place, and time. Tiffanie Kaur, was evaluated through a synchronous (real-time) audio-video encounter. The patient (or guardian if applicable) is aware that this is a billable service, which includes applicable co-pays.  This Virtual Visit was conducted with patient's (and/or legal guardian's)

## 2023-06-06 RX ORDER — ATORVASTATIN CALCIUM 20 MG/1
TABLET, FILM COATED ORAL
Qty: 30 TABLET | Refills: 0 | OUTPATIENT
Start: 2023-06-06

## 2023-06-06 NOTE — TELEPHONE ENCOUNTER
Patient is on Lipitor 40 MG. #90 sent on 05/03/2023 with refills. RX can be canceled. Called pt, and left a voice message, asking that he call and confirm he was able to  his medication.

## 2023-07-24 RX ORDER — SERTRALINE HYDROCHLORIDE 100 MG/1
TABLET, FILM COATED ORAL DAILY
Qty: 90 TABLET | Refills: 3 | Status: SHIPPED | OUTPATIENT
Start: 2023-07-24

## 2023-07-30 RX ORDER — SERTRALINE HYDROCHLORIDE 100 MG/1
TABLET, FILM COATED ORAL DAILY
Qty: 90 TABLET | Refills: 3 | OUTPATIENT
Start: 2023-07-30

## 2023-09-12 DIAGNOSIS — E78.00 HYPERCHOLESTEROLEMIA: ICD-10-CM

## 2023-09-12 NOTE — TELEPHONE ENCOUNTER
Pt scheduled for 4 month follow up with Dr. Masters Doing on 10/2/23 at 10:45 am but is out of refills on Atorvastatin 40 mg and needs prescription sent to [address] [place] [address] at [facility].  aJniya

## 2023-09-17 RX ORDER — ATORVASTATIN CALCIUM 40 MG/1
40 TABLET, FILM COATED ORAL DAILY
Qty: 90 TABLET | Refills: 2 | Status: SHIPPED | OUTPATIENT
Start: 2023-09-17

## 2023-09-28 ENCOUNTER — TELEPHONE (OUTPATIENT)
Facility: CLINIC | Age: 53
End: 2023-09-28

## 2023-09-28 DIAGNOSIS — I10 ESSENTIAL HYPERTENSION: Primary | ICD-10-CM

## 2023-09-28 DIAGNOSIS — E11.9 CONTROLLED TYPE 2 DIABETES MELLITUS WITHOUT COMPLICATION, WITHOUT LONG-TERM CURRENT USE OF INSULIN (HCC): ICD-10-CM

## 2023-09-28 NOTE — TELEPHONE ENCOUNTER
----- Message from Halina Perry sent at 9/27/2023  1:22 PM EDT -----  Subject: Message to Provider    QUESTIONS  Information for Provider? Patient has an upcoming appt with Dr Demian Bar on   Monday 10/2/23 and he would like to know if he needs bloodwork down before   the appt. Please call the patient to advise.   ---------------------------------------------------------------------------  --------------  Markus Burton INFO  [phone]; OK to leave message on voicemail  ---------------------------------------------------------------------------  --------------  SCRIPT ANSWERS  Relationship to Patient?  Self

## 2023-10-02 ENCOUNTER — OFFICE VISIT (OUTPATIENT)
Facility: CLINIC | Age: 53
End: 2023-10-02
Payer: COMMERCIAL

## 2023-10-02 VITALS
HEART RATE: 80 BPM | HEIGHT: 70 IN | BODY MASS INDEX: 32.27 KG/M2 | DIASTOLIC BLOOD PRESSURE: 68 MMHG | TEMPERATURE: 97.9 F | OXYGEN SATURATION: 97 % | SYSTOLIC BLOOD PRESSURE: 123 MMHG | WEIGHT: 225.4 LBS | RESPIRATION RATE: 18 BRPM

## 2023-10-02 DIAGNOSIS — Z11.4 SCREENING FOR HIV (HUMAN IMMUNODEFICIENCY VIRUS): ICD-10-CM

## 2023-10-02 DIAGNOSIS — E78.00 HYPERCHOLESTEROLEMIA: ICD-10-CM

## 2023-10-02 DIAGNOSIS — E11.9 CONTROLLED TYPE 2 DIABETES MELLITUS WITHOUT COMPLICATION, WITHOUT LONG-TERM CURRENT USE OF INSULIN (HCC): Primary | ICD-10-CM

## 2023-10-02 DIAGNOSIS — I10 ESSENTIAL HYPERTENSION: ICD-10-CM

## 2023-10-02 PROCEDURE — G8417 CALC BMI ABV UP PARAM F/U: HCPCS | Performed by: FAMILY MEDICINE

## 2023-10-02 PROCEDURE — 3078F DIAST BP <80 MM HG: CPT | Performed by: FAMILY MEDICINE

## 2023-10-02 PROCEDURE — 3074F SYST BP LT 130 MM HG: CPT | Performed by: FAMILY MEDICINE

## 2023-10-02 PROCEDURE — G8427 DOCREV CUR MEDS BY ELIG CLIN: HCPCS | Performed by: FAMILY MEDICINE

## 2023-10-02 PROCEDURE — 1036F TOBACCO NON-USER: CPT | Performed by: FAMILY MEDICINE

## 2023-10-02 PROCEDURE — G8484 FLU IMMUNIZE NO ADMIN: HCPCS | Performed by: FAMILY MEDICINE

## 2023-10-02 PROCEDURE — 3046F HEMOGLOBIN A1C LEVEL >9.0%: CPT | Performed by: FAMILY MEDICINE

## 2023-10-02 PROCEDURE — 2022F DILAT RTA XM EVC RTNOPTHY: CPT | Performed by: FAMILY MEDICINE

## 2023-10-02 PROCEDURE — 3017F COLORECTAL CA SCREEN DOC REV: CPT | Performed by: FAMILY MEDICINE

## 2023-10-02 PROCEDURE — 99214 OFFICE O/P EST MOD 30 MIN: CPT | Performed by: FAMILY MEDICINE

## 2023-10-02 RX ORDER — ATORVASTATIN CALCIUM 40 MG/1
40 TABLET, FILM COATED ORAL DAILY
Qty: 90 TABLET | Refills: 2 | Status: SHIPPED | OUTPATIENT
Start: 2023-10-02

## 2023-10-02 ASSESSMENT — ENCOUNTER SYMPTOMS: SHORTNESS OF BREATH: 0

## 2024-03-05 SDOH — ECONOMIC STABILITY: INCOME INSECURITY: HOW HARD IS IT FOR YOU TO PAY FOR THE VERY BASICS LIKE FOOD, HOUSING, MEDICAL CARE, AND HEATING?: NOT HARD AT ALL

## 2024-03-05 SDOH — ECONOMIC STABILITY: FOOD INSECURITY: WITHIN THE PAST 12 MONTHS, YOU WORRIED THAT YOUR FOOD WOULD RUN OUT BEFORE YOU GOT MONEY TO BUY MORE.: NEVER TRUE

## 2024-03-05 SDOH — ECONOMIC STABILITY: HOUSING INSECURITY
IN THE LAST 12 MONTHS, WAS THERE A TIME WHEN YOU DID NOT HAVE A STEADY PLACE TO SLEEP OR SLEPT IN A SHELTER (INCLUDING NOW)?: NO

## 2024-03-05 SDOH — ECONOMIC STABILITY: FOOD INSECURITY: WITHIN THE PAST 12 MONTHS, THE FOOD YOU BOUGHT JUST DIDN'T LAST AND YOU DIDN'T HAVE MONEY TO GET MORE.: NEVER TRUE

## 2024-03-05 NOTE — PROGRESS NOTES
Chief Complaint   Patient presents with    Follow-up    Discuss Labs     Review.     1. Have you been to the ER, urgent care clinic since your last visit?  Hospitalized since your last visit?No    2. Have you seen or consulted any other health care providers outside of the [facility] since your last visit?  Include any pap smears or colon screening. No

## 2024-03-06 ENCOUNTER — TELEMEDICINE (OUTPATIENT)
Facility: CLINIC | Age: 54
End: 2024-03-06

## 2024-03-06 DIAGNOSIS — Z02.9 ADMINISTRATIVE ENCOUNTER: Primary | ICD-10-CM

## 2024-06-10 ENCOUNTER — TELEPHONE (OUTPATIENT)
Facility: CLINIC | Age: 54
End: 2024-06-10

## 2024-06-10 NOTE — TELEPHONE ENCOUNTER
Called pt back. Explained that I could not get him in until December due to the fullness of our schedule. PT stated that he would be going to another practice, that his wife goes to.         ----- Message from Jeri Richardson sent at 6/10/2024 11:55 AM EDT -----  Subject: Appointment Request    Reason for Call: New Patient/New to Provider Appointment needed: Routine   Existing Condition Follow Up    QUESTIONS    Reason for appointment request? No appointments available during search     Additional Information for Provider? Patient is out of some medications,   montelukast and atorvastatin, that they are now out of and is wanting to   book an appointment to continue to be established at this location. Please   call back to advise.  ---------------------------------------------------------------------------  --------------  CALL BACK INFO  [phone]; OK to leave message on Zing,OK to respond with electronic   message via [email or web address] portal (only for patients who have registered [email or web address]   account)  ---------------------------------------------------------------------------  --------------  SCRIPT ANSWERS